# Patient Record
Sex: FEMALE | Race: BLACK OR AFRICAN AMERICAN | NOT HISPANIC OR LATINO | Employment: UNEMPLOYED | URBAN - METROPOLITAN AREA
[De-identification: names, ages, dates, MRNs, and addresses within clinical notes are randomized per-mention and may not be internally consistent; named-entity substitution may affect disease eponyms.]

---

## 2017-02-19 ENCOUNTER — HOSPITAL ENCOUNTER (EMERGENCY)
Facility: OTHER | Age: 32
Discharge: HOME | End: 2017-02-20
Attending: EMERGENCY MEDICINE
Payer: MEDICAID

## 2017-02-19 DIAGNOSIS — R19.7 DIARRHEA, UNSPECIFIED TYPE: ICD-10-CM

## 2017-02-19 DIAGNOSIS — R11.2 NON-INTRACTABLE VOMITING WITH NAUSEA, UNSPECIFIED VOMITING TYPE: Primary | ICD-10-CM

## 2017-02-19 LAB
B-HCG UR QL: NEGATIVE
CTP QC/QA: YES

## 2017-02-19 PROCEDURE — 25000003 PHARM REV CODE 250: Performed by: PHYSICIAN ASSISTANT

## 2017-02-19 PROCEDURE — 99283 EMERGENCY DEPT VISIT LOW MDM: CPT

## 2017-02-19 PROCEDURE — 81025 URINE PREGNANCY TEST: CPT | Performed by: EMERGENCY MEDICINE

## 2017-02-19 RX ORDER — ONDANSETRON 4 MG/1
4 TABLET, ORALLY DISINTEGRATING ORAL
Status: COMPLETED | OUTPATIENT
Start: 2017-02-19 | End: 2017-02-19

## 2017-02-19 RX ORDER — ONDANSETRON 4 MG/1
4 TABLET, ORALLY DISINTEGRATING ORAL EVERY 8 HOURS PRN
Qty: 12 TABLET | Refills: 0 | Status: SHIPPED | OUTPATIENT
Start: 2017-02-19 | End: 2022-07-20

## 2017-02-19 RX ORDER — DICYCLOMINE HYDROCHLORIDE 10 MG/1
10 CAPSULE ORAL
Qty: 20 CAPSULE | Refills: 0 | Status: SHIPPED | OUTPATIENT
Start: 2017-02-19 | End: 2017-03-21

## 2017-02-19 RX ORDER — DICYCLOMINE HYDROCHLORIDE 10 MG/1
10 CAPSULE ORAL
Status: COMPLETED | OUTPATIENT
Start: 2017-02-19 | End: 2017-02-19

## 2017-02-19 RX ADMIN — ONDANSETRON 4 MG: 4 TABLET, ORALLY DISINTEGRATING ORAL at 10:02

## 2017-02-19 RX ADMIN — DICYCLOMINE HYDROCHLORIDE 10 MG: 10 CAPSULE ORAL at 11:02

## 2017-02-19 NOTE — ED AVS SNAPSHOT
[facility]  [address]  [facility] [address]               Craig De Paz   2017 10:37 PM   ED    Description:  Female : 1985   Department:  [facility]           Your Care was Coordinated By:     Provider Role From To    Imer Tovar MD Attending Provider 17 --    Ana Maria Jeffers PA-C Physician Assistant 17 --      Reason for Visit     Nausea     Vomiting     Diarrhea           Diagnoses this Visit        Comments    Non-intractable vomiting with nausea, unspecified vomiting type    -  Primary     Diarrhea, unspecified type           ED Disposition     None           To Do List           Follow-up Information     Follow up with [facility] In 2 days.    Contact information:    [address]  [facility] [address] [phone]          Follow up with [facility].    Specialty:  Emergency Medicine    Why:  If symptoms worsen    Contact information:    [address]  [facility] [address] [phone]       These Medications        Disp Refills Start End    ondansetron (ZOFRAN-ODT) 4 MG TbDL 12 tablet 0 2017     Take 1 tablet (4 mg total) by mouth every 8 (eight) hours as needed. - Oral    Pharmacy: [facility] [address] [place] [address] [facility] Ph #: [phone]       dicyclomine (BENTYL) 10 MG capsule 20 capsule 0 2017 3/21/2017    Take 1 capsule (10 mg total) by mouth 4 (four) times daily before meals and nightly. - Oral    Pharmacy: [facility] [address] [place] [address] [facility] Ph #: [phone]         [facility]     [facility] Care [facility] -  Assistance  Registered nurses in the [facility] provide clinical advisement, health education, appointment booking, and other advisory services.  Call for this free service at  "[phone].             Medications           Message regarding Medications     Verify the changes and/or additions to your medication regime listed below are the same as discussed with your clinician today.  If any of these changes or additions are incorrect, please notify your healthcare provider.        START taking these NEW medications        Refills    ondansetron (ZOFRAN-ODT) 4 MG TbDL 0    Sig: Take 1 tablet (4 mg total) by mouth every 8 (eight) hours as needed.    Class: Print    Route: Oral    dicyclomine (BENTYL) 10 MG capsule 0    Sig: Take 1 capsule (10 mg total) by mouth 4 (four) times daily before meals and nightly.    Class: Print    Route: Oral      These medications were administered today        Dose Freq    ondansetron disintegrating tablet 4 mg 4 mg ED 1 Time    Sig: Take 1 tablet (4 mg total) by mouth ED 1 Time.    Class: Normal    Route: Oral    dicyclomine capsule 10 mg 10 mg ED 1 Time    Sig: Take 1 capsule (10 mg total) by mouth ED 1 Time.    Class: Normal    Route: Oral      STOP taking these medications     ibuprofen (ADVIL,MOTRIN) 800 MG tablet Take 1 tablet (800 mg total) by mouth 3 (three) times daily.           Verify that the below list of medications is an accurate representation of the medications you are currently taking.  If none reported, the list may be blank. If incorrect, please contact your healthcare provider. Carry this list with you in case of emergency.           Current Medications     dicyclomine (BENTYL) 10 MG capsule Take 1 capsule (10 mg total) by mouth 4 (four) times daily before meals and nightly.    ondansetron (ZOFRAN-ODT) 4 MG TbDL Take 1 tablet (4 mg total) by mouth every 8 (eight) hours as needed.           Clinical Reference Information           Your Vitals Were     BP Pulse Temp Resp Height Weight    126/75 (BP Location: Left arm) 96 98.9 °F (37.2 °C) (Oral) 16 5' 7" (1.702 m) 83 kg (183 lb)    SpO2 BMI             98% 28.66 kg/m2         Allergies as " of 2/19/2017     No Known Allergies      Immunizations Administered on Date of Encounter - 2/19/2017     None      ED Micro, Lab, POCT     Start Ordered       Status Ordering Provider    02/19/17 2123 02/19/17 2122  POCT urine pregnancy  Once      Final result       ED Imaging Orders     None      Discharge References/Attachments     VOMITING OR DIARRHEA (ADULT), DIET FOR (ENGLISH)    VOMITING AND DIARRHEA, SELF-CARE FOR (ENGLISH)    VOMITING AND DIARRHEA, NONSPECIFIC (ADULT) (ENGLISH)      [facility] Sign-Up     Activating your [facility] account is as easy as 1-2-3!     1) Visit [email or web address], select Sign Up Now, enter this activation code and your date of birth, then select Next.  [email or web address]  Expires: 4/5/2017 11:56 PM      2) Create a username and password to use when you visit [facility] in the future and select a security question in case you lose your password and select Next.    3) Enter your e-mail address and click Sign Up!    Additional Information  If you have questions, please e-mail [email or web address] or call [phone] to talk to our [facility] staff. Remember, [facility] is NOT to be used for urgent needs. For medical emergencies, dial 911.          [facility] complies with applicable Federal civil rights laws and does not discriminate on the basis of race, color, national origin, age, disability, or sex.        Language Assistance Services     ATTENTION: Language assistance services are available, free of charge. Please call [phone].      ATENCIÓN: Si habla español, tiene a [place] disposición servicios gratuitos de asistencia lingüística. Llame al [phone].     CHÚ Ý: N?u b?n nói Ti?ng Vi?t, có các d?ch v? h? tr? ngôn ng? mi?n phí dành cho b?n. G?i s? [phone].

## 2017-02-20 VITALS
OXYGEN SATURATION: 99 % | HEART RATE: 82 BPM | DIASTOLIC BLOOD PRESSURE: 76 MMHG | WEIGHT: 183 LBS | BODY MASS INDEX: 28.72 KG/M2 | RESPIRATION RATE: 16 BRPM | TEMPERATURE: 98 F | SYSTOLIC BLOOD PRESSURE: 122 MMHG | HEIGHT: 67 IN

## 2017-02-20 NOTE — ED PROVIDER NOTES
Encounter Date: 2/19/2017       History     Chief Complaint   Patient presents with    Nausea     Pt c/o N/V/D that started around 1pm today    Vomiting    Diarrhea     Review of patient's allergies indicates:  No Known Allergies  HPI Comments: Patient is a 31 y.o. female presenting to the emergency department with complaints of nausea, vomiting, and diarrhea.  The patient reports that she had lunch at approximately 1 PM.  She states that shortly thereafter, she started having nonbloody nausea, vomiting, diarrhea.  She states that she estimates about 7 episodes of diarrhea and 5 episodes of emesis.   She reports generalized abdominal pain that she rates at 9/10.  She states she's been unable to tolerate by mouth intake since then.  She denies fever or chills.    The history is provided by the patient.     Past Medical History   Diagnosis Date    Vaginal delivery following previous caesarean section      x1     Past Medical History Pertinent Negatives   Diagnosis Date Noted    Asthma 6/25/2014    Depression 6/25/2014    Diabetes mellitus 6/25/2014    GERD (gastroesophageal reflux disease) 6/25/2014    Hypertension 6/25/2014     Past Surgical History   Procedure Laterality Date    Hernia repair       Rt groin     Family History   Problem Relation Age of Onset    No Known Problems Mother     No Known Problems Father      Social History   Substance Use Topics    Smoking status: Never Smoker    Smokeless tobacco: Never Used    Alcohol use Yes      Comment: occassional     Review of Systems   Constitutional: Negative for activity change, appetite change, chills, fatigue and fever.   HENT: Negative for congestion, rhinorrhea, sinus pressure, sneezing, sore throat and trouble swallowing.    Eyes: Negative for photophobia and visual disturbance.   Respiratory: Negative for cough, chest tightness, shortness of breath and wheezing.    Cardiovascular: Negative for chest pain and palpitations.   Gastrointestinal:  Positive for diarrhea, nausea and vomiting. Negative for abdominal pain and constipation.   Genitourinary: Negative for dysuria, hematuria and urgency.   Musculoskeletal: Negative for back pain, myalgias, neck pain and neck stiffness.   Skin: Negative for color change and wound.   Neurological: Negative for dizziness, weakness, light-headedness, numbness and headaches.   Psychiatric/Behavioral: Negative for agitation and confusion.       Physical Exam   Initial Vitals   BP Pulse Resp Temp SpO2   02/19/17 2120 02/19/17 2120 02/19/17 2120 02/19/17 2120 02/19/17 2120   126/75 96 16 98.9 °F (37.2 °C) 98 %     Physical Exam    Nursing note and vitals reviewed.  Constitutional: Vital signs are normal. She appears well-developed and well-nourished. She is not diaphoretic. She is cooperative.  Non-toxic appearance. She does not have a sickly appearance. She does not appear ill. No distress.   -American female accompanied by another female me emergency department.  She is speaking in clear and full sentences, moving all extremities, ambulates without difficulty.  She is in no acute distress.   HENT:   Head: Normocephalic and atraumatic.   Right Ear: External ear normal.   Left Ear: External ear normal.   Nose: Nose normal.   Mouth/Throat: Oropharynx is clear and moist and mucous membranes are normal.   Eyes: Conjunctivae and EOM are normal.   Neck: Normal range of motion. Neck supple.   Cardiovascular: Normal rate, regular rhythm and normal heart sounds.   Pulmonary/Chest: Breath sounds normal. No respiratory distress. She has no wheezes. She has no rhonchi. She has no rales.   Abdominal: Soft. Bowel sounds are normal. She exhibits no distension. There is generalized tenderness. There is no rebound and no guarding.   Musculoskeletal: Normal range of motion.   Neurological: She is alert and oriented to person, place, and time. GCS eye subscore is 4. GCS verbal subscore is 5. GCS motor subscore is 6.   Skin: Skin is  warm.   Psychiatric: She has a normal mood and affect. Her behavior is normal. Judgment and thought content normal.         ED Course   Procedures  Labs Reviewed   POCT URINE PREGNANCY - Normal             Medical Decision Making:   Clinical Tests:   Lab Tests: Ordered and Reviewed  The following lab test(s) were unremarkable: UPT  Other:   I have discussed this case with another health care provider.       <> Summary of the Discussion: Dr. Tovar  This note was created using Dragon Medical Dictation. There may be typographical errors secondary to dictation.     Urgent evaluation of a 31 y.o. female presenting to the emergency department complaining of nausea, vomiting and diarrhea. Patient is afebrile, nontoxic appearing and hemodynamically stable. Physical exam reveals regular rate and rhythm, lungs are clear to auscultation bilaterally.  Generalized tenderness to palpation of the abdomen with no rebound, guarding, mass.  Will plan to obtain UPT, administer Zofran, try by mouth challenge.  On reassessment, the patient reported she was feeling much relief.  We'll administered Bentyl for abdominal cramping.  Given the patient's history and physical exam findings, she does not have evidence of an acute abdomen.  I do believe her signs and symptoms are likely secondary to a viral etiology.  I do not feel that further testing or imaging is warranted the emergency department.  The patient will be discharged home with a prescription for Zofran and Bentyl.  She was counseled extensively on symptomatic care and treatment including a bland diet. The patient was instructed to follow up with a primary care provider in 2 days or to return to the emergency department for worsening symptoms. The treatment plan was discussed with the patient who demonstrated understanding and comfort with plan. The patient's history, physical exam, and plan of care was discussed with and agreed upon with my supervising physician.     Past Medical  History   Diagnosis Date    Vaginal delivery following previous caesarean section      x1                     ED Course     Clinical Impression:     1. Non-intractable vomiting with nausea, unspecified vomiting type    2. Diarrhea, unspecified type       Disposition:   Disposition: Discharged  Condition: Stable       Ana Maria Jeffers PA-C  02/20/17 0056

## 2019-07-15 ENCOUNTER — HOSPITAL ENCOUNTER (EMERGENCY)
Facility: OTHER | Age: 34
Discharge: HOME | End: 2019-07-15
Attending: EMERGENCY MEDICINE
Payer: MEDICAID

## 2019-07-15 VITALS
SYSTOLIC BLOOD PRESSURE: 138 MMHG | OXYGEN SATURATION: 100 % | DIASTOLIC BLOOD PRESSURE: 80 MMHG | BODY MASS INDEX: 29.35 KG/M2 | TEMPERATURE: 99 F | RESPIRATION RATE: 16 BRPM | WEIGHT: 187 LBS | HEIGHT: 67 IN | HEART RATE: 82 BPM

## 2019-07-15 DIAGNOSIS — R06.02 SHORTNESS OF BREATH: ICD-10-CM

## 2019-07-15 DIAGNOSIS — R06.02 SOB (SHORTNESS OF BREATH): Primary | ICD-10-CM

## 2019-07-15 LAB
B-HCG UR QL: NEGATIVE
CTP QC/QA: YES

## 2019-07-15 PROCEDURE — 93005 ELECTROCARDIOGRAM TRACING: CPT

## 2019-07-15 PROCEDURE — 99284 EMERGENCY DEPT VISIT MOD MDM: CPT | Mod: 25

## 2019-07-15 PROCEDURE — 93010 EKG 12-LEAD: ICD-10-PCS | Mod: ,,, | Performed by: INTERNAL MEDICINE

## 2019-07-15 PROCEDURE — 93010 ELECTROCARDIOGRAM REPORT: CPT | Mod: ,,, | Performed by: INTERNAL MEDICINE

## 2019-07-15 PROCEDURE — 81025 URINE PREGNANCY TEST: CPT | Performed by: EMERGENCY MEDICINE

## 2019-07-16 NOTE — ED PROVIDER NOTES
"Encounter Date: 7/15/2019    SCRIBE #1 NOTE: I, Tres Edwards, am scribing for, and in the presence of, Dr. Desouza .       History     Chief Complaint   Patient presents with    Shortness of Breath     x months, w/ chest tightness x 3 days "since my client pointed out I was breathing hard, denies any cough, worse when sitting down, denies any respiratory or cardiac hx, speaking in full sentences     Time seen by provider: 10:00 PM    This is a 34 y.o. female who presents with complaint of shortness of breath that began approximately three months ago. The patient reports that she mainly started to notice that she has been feeling short of breath when laying down and in the middle of the night. She states that last night she wasn't able to sleep due to feeling short of breath. She states that she snores "a lot" at night and sometimes wakes up feeling like she cannot breath. She reports that she was told by her client recently that she has been breathing heavily which made her notice the shortness of breath. Her LNMP was on 6/1/19 and wasn't heavy. The patient denies taking any long trip or travel anywhere. She denies being on birth control. She denies fever, sore throat, chest pain, cough, nausea, and dysuria. She denies smoking, drinking, and illicit drug use.     The history is provided by the patient.     Review of patient's allergies indicates:  No Known Allergies  Past Medical History:   Diagnosis Date    Vaginal delivery following previous caesarean section     x1     Past Surgical History:   Procedure Laterality Date    BIOPSY, CERVIX N/A 5/7/2013    Performed by [facility] at [facility] OR    HERNIA REPAIR      Rt groin     Family History   Problem Relation Age of Onset    No Known Problems Mother     No Known Problems Father      Social History     Tobacco Use    Smoking status: Never Smoker    Smokeless tobacco: Never Used   Substance Use Topics    Alcohol use: Yes     Comment: occassional    " Drug use: No     Review of Systems   Constitutional: Negative for fever.   HENT: Negative for sore throat.    Respiratory: Positive for shortness of breath.    Cardiovascular: Negative for chest pain.   Gastrointestinal: Negative for nausea.   Genitourinary: Negative for dysuria.   Musculoskeletal: Negative for back pain.   Skin: Negative for rash.   Neurological: Negative for weakness.   Hematological: Does not bruise/bleed easily.       Physical Exam     Initial Vitals [07/15/19 2042]   BP Pulse Resp Temp SpO2   (Abnormal) 131/98 88 16 98.3 °F (36.8 °C) 100 %      MAP       --         Physical Exam    Nursing note and vitals reviewed.  Constitutional: She appears well-developed and well-nourished. She is not diaphoretic. No distress.   HENT:   Head: Normocephalic and atraumatic.   Mouth/Throat: Oropharynx is clear and moist.   Oropharynx is clear and intact. Moist mucous membranes.    Eyes: Conjunctivae and EOM are normal. Pupils are equal, round, and reactive to light.   Conjunctivae are clear, pink, and intact.    Neck: Normal range of motion.   Cardiovascular: Normal rate, regular rhythm and normal heart sounds. Exam reveals no gallop and no friction rub.    No murmur heard.  Normal S1, S2. No murmurs, no rubs, no gallops.    Pulmonary/Chest: Breath sounds normal. No respiratory distress. She has no wheezes. She has no rhonchi. She has no rales.   Lungs are clear to auscultation bilaterally.    Abdominal: Soft. There is no tenderness. There is no rebound and no guarding.   Musculoskeletal: Normal range of motion. She exhibits no edema or tenderness.   No lower extremity edema. No palpable chords.    Lymphadenopathy:     She has no cervical adenopathy.   Neurological: She is alert and oriented to person, place, and time.   Skin: Skin is warm and dry. No rash and no abscess noted. No erythema. No pallor.   Psychiatric: She has a normal mood and affect. Her behavior is normal. Judgment and thought content normal.          ED Course   Procedures  Labs Reviewed   POCT URINE PREGNANCY     EKG Readings: (Independently Interpreted)   Normal sinus rhythm at a rate of 79 bpm. No acute ST or T wave changes.        Imaging Results          X-Ray Chest PA And Lateral (Final result)  Result time 07/15/19 22:17:39    Final result by Desmond Lebron MD (07/15/19 22:17:39)             Impression:      No acute process.      Electronically signed by: Desmond Lebron MD  Date:    07/15/2019  Time:    22:17           Narrative:    EXAMINATION:  XR CHEST PA AND LATERAL    CLINICAL HISTORY:  Shortness of breath    TECHNIQUE:  PA and lateral views of the chest were performed.    COMPARISON:  None    FINDINGS:  The trachea is unremarkable.  The cardiomediastinal silhouette is within normal limits.  The hilar structures are unremarkable.  The hemidiaphragms are within normal limits.  There is no evidence of free air beneath the hemidiaphragms.  There are no pleural effusions.  There is no evidence of a pneumothorax.  There is no evidence of pneumomediastinum.  No airspace opacity is present.  The osseous structures are unremarkable.                            X-Rays:   Independently Interpreted Readings:   Chest X-Ray: Normal heart size.  No infiltrates.  No acute abnormalities.     Medical Decision Making:   History:   Old Medical Records: I decided to obtain old medical records.  Independently Interpreted Test(s):   I have ordered and independently interpreted X-rays - see prior notes.  I have ordered and independently interpreted EKG Reading(s) - see prior notes  Clinical Tests:   Lab Tests: Reviewed  Radiological Study: Reviewed  Medical Tests: Reviewed            Scribe Attestation:   Scribe #1: I performed the above scribed service and the documentation accurately describes the services I performed. I attest to the accuracy of the note.    Attending Attestation:           Physician Attestation for Scribe:  Physician Attestation Statement for  Scribe #1: I, Dr. Orourke, reviewed documentation, as scribed by Tres Edwards in my presence, and it is both accurate and complete.         Attending ED Notes:   Emergent evaluation a 34-year-old female with complaint of intermittent shortness of breath.  Patient is afebrile, nontoxic appearing with stable vital signs.  Lungs are clear to auscultation bilaterally. Patient in no acute respiratory distress.  Oxygen saturation is 100% on room air.  No acute findings on chest x-ray.  No risk factors for pulmonary embolus.  No acute findings on EKG.  The patient is extensively counseled on her diagnosis and treatment including all diagnostic and physical exam findings.  The patient discharged good condition and directed follow-up with her PCP in the next 24-48 hours.             Clinical Impression:     1. SOB (shortness of breath)    2. Shortness of breath                                   Charles Orourke MD  07/23/19 0899

## 2019-07-16 NOTE — ED TRIAGE NOTES
Pt arrives to Ed with c/o SOB. PT reports onset 3 months, reports problems breathing while sleeping at night, reports SOB when lying down. Pt sitting in chair, respirations even, unlabored, eyes open spontaneously, NAD noted, AAOx4, answering questions appropriately. Pt placed on BP cycling and pulse ox.

## 2019-09-15 ENCOUNTER — HOSPITAL ENCOUNTER (EMERGENCY)
Facility: OTHER | Age: 34
Discharge: HOME | End: 2019-09-15
Attending: EMERGENCY MEDICINE
Payer: MEDICAID

## 2019-09-15 VITALS
TEMPERATURE: 98 F | HEART RATE: 67 BPM | DIASTOLIC BLOOD PRESSURE: 83 MMHG | HEIGHT: 67 IN | OXYGEN SATURATION: 100 % | SYSTOLIC BLOOD PRESSURE: 142 MMHG | RESPIRATION RATE: 16 BRPM | WEIGHT: 187 LBS | BODY MASS INDEX: 29.35 KG/M2

## 2019-09-15 DIAGNOSIS — R14.0 ABDOMINAL BLOATING: ICD-10-CM

## 2019-09-15 DIAGNOSIS — O21.9 NAUSEA/VOMITING IN PREGNANCY: Primary | ICD-10-CM

## 2019-09-15 LAB
ALBUMIN SERPL BCP-MCNC: 3.4 G/DL (ref 3.5–5.2)
ALP SERPL-CCNC: 59 U/L (ref 55–135)
ALT SERPL W/O P-5'-P-CCNC: 27 U/L (ref 10–44)
ANION GAP SERPL CALC-SCNC: 8 MMOL/L (ref 8–16)
AST SERPL-CCNC: 20 U/L (ref 10–40)
B-HCG UR QL: POSITIVE
BASOPHILS # BLD AUTO: 0.04 K/UL (ref 0–0.2)
BASOPHILS NFR BLD: 0.6 % (ref 0–1.9)
BILIRUB SERPL-MCNC: 0.2 MG/DL (ref 0.1–1)
BILIRUB UR QL STRIP: NEGATIVE
BUN SERPL-MCNC: 8 MG/DL (ref 6–20)
CALCIUM SERPL-MCNC: 9.4 MG/DL (ref 8.7–10.5)
CHLORIDE SERPL-SCNC: 105 MMOL/L (ref 95–110)
CLARITY UR: CLEAR
CO2 SERPL-SCNC: 24 MMOL/L (ref 23–29)
COLOR UR: YELLOW
CREAT SERPL-MCNC: 0.7 MG/DL (ref 0.5–1.4)
CTP QC/QA: YES
DIFFERENTIAL METHOD: ABNORMAL
EOSINOPHIL # BLD AUTO: 0.1 K/UL (ref 0–0.5)
EOSINOPHIL NFR BLD: 0.9 % (ref 0–8)
ERYTHROCYTE [DISTWIDTH] IN BLOOD BY AUTOMATED COUNT: 14.6 % (ref 11.5–14.5)
EST. GFR  (AFRICAN AMERICAN): >60 ML/MIN/1.73 M^2
EST. GFR  (NON AFRICAN AMERICAN): >60 ML/MIN/1.73 M^2
GLUCOSE SERPL-MCNC: 94 MG/DL (ref 70–110)
GLUCOSE UR QL STRIP: NEGATIVE
HCG INTACT+B SERPL-ACNC: NORMAL MIU/ML
HCT VFR BLD AUTO: 38.1 % (ref 37–48.5)
HGB BLD-MCNC: 12.4 G/DL (ref 12–16)
HGB UR QL STRIP: NEGATIVE
IMM GRANULOCYTES # BLD AUTO: 0.02 K/UL (ref 0–0.04)
IMM GRANULOCYTES NFR BLD AUTO: 0.3 % (ref 0–0.5)
KETONES UR QL STRIP: NEGATIVE
LEUKOCYTE ESTERASE UR QL STRIP: NEGATIVE
LYMPHOCYTES # BLD AUTO: 2.6 K/UL (ref 1–4.8)
LYMPHOCYTES NFR BLD: 37.1 % (ref 18–48)
MCH RBC QN AUTO: 26.9 PG (ref 27–31)
MCHC RBC AUTO-ENTMCNC: 32.5 G/DL (ref 32–36)
MCV RBC AUTO: 83 FL (ref 82–98)
MONOCYTES # BLD AUTO: 0.8 K/UL (ref 0.3–1)
MONOCYTES NFR BLD: 11.3 % (ref 4–15)
NEUTROPHILS # BLD AUTO: 3.5 K/UL (ref 1.8–7.7)
NEUTROPHILS NFR BLD: 49.8 % (ref 38–73)
NITRITE UR QL STRIP: NEGATIVE
NRBC BLD-RTO: 0 /100 WBC
PH UR STRIP: 6 [PH] (ref 5–8)
PLATELET # BLD AUTO: 259 K/UL (ref 150–350)
PMV BLD AUTO: 10.6 FL (ref 9.2–12.9)
POCT GLUCOSE: 92 MG/DL (ref 70–110)
POTASSIUM SERPL-SCNC: 4.2 MMOL/L (ref 3.5–5.1)
PROT SERPL-MCNC: 7.3 G/DL (ref 6–8.4)
PROT UR QL STRIP: NEGATIVE
RBC # BLD AUTO: 4.61 M/UL (ref 4–5.4)
SODIUM SERPL-SCNC: 137 MMOL/L (ref 136–145)
SP GR UR STRIP: 1.01 (ref 1–1.03)
TSH SERPL DL<=0.005 MIU/L-ACNC: 2.11 UIU/ML (ref 0.4–4)
URN SPEC COLLECT METH UR: NORMAL
UROBILINOGEN UR STRIP-ACNC: NEGATIVE EU/DL
WBC # BLD AUTO: 7.01 K/UL (ref 3.9–12.7)

## 2019-09-15 PROCEDURE — 84443 ASSAY THYROID STIM HORMONE: CPT

## 2019-09-15 PROCEDURE — 96365 THER/PROPH/DIAG IV INF INIT: CPT

## 2019-09-15 PROCEDURE — 82962 GLUCOSE BLOOD TEST: CPT

## 2019-09-15 PROCEDURE — 85025 COMPLETE CBC W/AUTO DIFF WBC: CPT

## 2019-09-15 PROCEDURE — 87491 CHLMYD TRACH DNA AMP PROBE: CPT

## 2019-09-15 PROCEDURE — 81025 URINE PREGNANCY TEST: CPT | Performed by: EMERGENCY MEDICINE

## 2019-09-15 PROCEDURE — 99284 EMERGENCY DEPT VISIT MOD MDM: CPT | Mod: 25

## 2019-09-15 PROCEDURE — 80053 COMPREHEN METABOLIC PANEL: CPT

## 2019-09-15 PROCEDURE — 84702 CHORIONIC GONADOTROPIN TEST: CPT

## 2019-09-15 PROCEDURE — 36415 COLL VENOUS BLD VENIPUNCTURE: CPT

## 2019-09-15 PROCEDURE — 81003 URINALYSIS AUTO W/O SCOPE: CPT

## 2019-09-15 PROCEDURE — 63600175 PHARM REV CODE 636 W HCPCS: Performed by: EMERGENCY MEDICINE

## 2019-09-15 PROCEDURE — 96360 HYDRATION IV INFUSION INIT: CPT | Mod: 59

## 2019-09-15 RX ORDER — PROMETHAZINE HYDROCHLORIDE 25 MG/1
12.5 TABLET ORAL EVERY 6 HOURS PRN
Qty: 15 TABLET | Refills: 1 | Status: SHIPPED | OUTPATIENT
Start: 2019-09-15 | End: 2022-07-20

## 2019-09-15 RX ADMIN — SODIUM CHLORIDE 1000 ML: 0.9 INJECTION, SOLUTION INTRAVENOUS at 08:09

## 2019-09-15 RX ADMIN — PROMETHAZINE HYDROCHLORIDE 12.5 MG: 25 INJECTION INTRAMUSCULAR; INTRAVENOUS at 09:09

## 2019-09-16 LAB
C TRACH DNA SPEC QL NAA+PROBE: DETECTED
N GONORRHOEA DNA SPEC QL NAA+PROBE: NOT DETECTED

## 2019-09-16 NOTE — ED TRIAGE NOTES
"Patient presents to ER c/o fatigue and nauseous for 1 week. Pt states she is 8 weeks pregnant.  Pt reports being seen by her PCP on 9/11 and was prescribed Zofran for nausea but states "my medication was never called into the pharmacy."  Pt denies abdominal pain and vaginal bleeding. +n/-v/-diarrhea.  "

## 2019-09-16 NOTE — ED PROVIDER NOTES
"Encounter Date: 9/15/2019    SCRIBE #1 NOTE: I, Jazmin Rueda, am scribing for, and in the presence of, Dr. Bauer.       History     Chief Complaint   Patient presents with    Fatigue     and nausea x 1 week. LMP 7/19.      Time seen by provider: 8:23 PM    This is a 34 y.o. female who presents with complaint of generalized fatigue that began one week ago. Onset is accompanied by nausea. Patient was seen by her PCP for same symptoms 4 days ago and states he has not yet called in her Zofran prescription. She reports she took a home pregnancy test that was positive. Patient is G3.P1.A1. She had a early pregnancy miscarriage at 8 weeks gestation. Her last menstrual period was 7/19. Patient reports abdominal bloating, and states "it feels like my cycle is about to come." She denies fever, chills, vomiting, abdominal cramping, chest pain, SOB, leg swelling, leg pain, or weakness.    The history is provided by the patient.     Review of patient's allergies indicates:  No Known Allergies  Past Medical History:   Diagnosis Date    Vaginal delivery following previous caesarean section     x1     Past Surgical History:   Procedure Laterality Date    BIOPSY, CERVIX N/A 5/7/2013    Performed by [facility]IK[facility] at [facility] OR    HERNIA REPAIR      Rt groin     Family History   Problem Relation Age of Onset    No Known Problems Mother     No Known Problems Father      Social History     Tobacco Use    Smoking status: Never Smoker    Smokeless tobacco: Never Used   Substance Use Topics    Alcohol use: Yes     Comment: occassional    Drug use: No     Review of Systems   Constitutional: Positive for fatigue. Negative for chills and fever.   HENT: Negative for congestion, sore throat and trouble swallowing.    Eyes: Negative for photophobia and visual disturbance.   Respiratory: Negative for shortness of breath.    Cardiovascular: Negative for chest pain and leg swelling.   Gastrointestinal: Positive for nausea. Negative " for abdominal pain and vomiting.        Positive for abdominal bloating.   Genitourinary: Negative for dysuria and hematuria.   Musculoskeletal: Negative for back pain and neck pain.        Negative for leg pain.   Skin: Negative for rash and wound.   Neurological: Negative for weakness and headaches.   Psychiatric/Behavioral: Negative.        Physical Exam     Initial Vitals [09/15/19 1909]   BP Pulse Resp Temp SpO2   134/86 83 16 99.1 °F (37.3 °C) 100 %      MAP       --         Physical Exam    Nursing note and vitals reviewed.  Constitutional: She appears well-developed and well-nourished. No distress.   HENT:   Head: Normocephalic and atraumatic.   Eyes: Conjunctivae and EOM are normal. Pupils are equal, round, and reactive to light.   Neck: Normal range of motion. Neck supple.   Cardiovascular: Normal rate, regular rhythm and normal heart sounds. Exam reveals no gallop and no friction rub.    No murmur heard.  Pulmonary/Chest: Breath sounds normal. No respiratory distress. She has no wheezes. She has no rhonchi. She has no rales.   Abdominal: Soft. There is no tenderness. There is no rebound and no guarding.   Genitourinary: Uterus is enlarged. Cervix exhibits discharge. Right adnexum displays no mass and no tenderness. Left adnexum displays no mass and no tenderness.   Genitourinary Comments: No bleeding  Seven white vaginal discharge  Os closed  No cervical motion tenderness   Musculoskeletal: Normal range of motion. She exhibits no edema or tenderness.   Neurological: She is alert and oriented to person, place, and time. She has normal strength.   Skin: Skin is warm and dry. No rash noted.   Psychiatric: She has a normal mood and affect. Her behavior is normal. Judgment and thought content normal.         ED Course   Procedures  Labs Reviewed   POCT URINE PREGNANCY - Abnormal; Notable for the following components:       Result Value    POC Preg Test, Ur Positive (*)     All other components within normal  limits   URINALYSIS, REFLEX TO URINE CULTURE    Narrative:     Preferred Collection Type->Urine, Clean Catch   URINALYSIS, REFLEX TO URINE CULTURE   POCT GLUCOSE   POCT GLUCOSE MONITORING CONTINUOUS          Imaging Results    None          Medical Decision Making:   History:   Old Medical Records: I decided to obtain old medical records.  Clinical Tests:   Lab Tests: Ordered and Reviewed  Radiological Study: Ordered and Reviewed            Scribe Attestation:   Scribe #1: I performed the above scribed service and the documentation accurately describes the services I performed. I attest to the accuracy of the note.    Attending Attestation:           Physician Attestation for Scribe:  Physician Attestation Statement for Scribe #1: I, Dr. Bauer, reviewed documentation, as scribed by Jazmin Rueda in my presence, and it is both accurate and complete.                    Clinical Impression:     1. Nausea/vomiting in pregnancy    2. Abdominal bloating                                 Radha Bauer MD  09/22/19 5638

## 2020-01-14 ENCOUNTER — HOSPITAL ENCOUNTER (OUTPATIENT)
Facility: HOSPITAL | Age: 35
Discharge: HOME | End: 2020-01-14
Attending: OBSTETRICS & GYNECOLOGY | Admitting: OBSTETRICS & GYNECOLOGY
Payer: MEDICAID

## 2020-01-14 VITALS
SYSTOLIC BLOOD PRESSURE: 117 MMHG | OXYGEN SATURATION: 100 % | TEMPERATURE: 98 F | HEART RATE: 94 BPM | DIASTOLIC BLOOD PRESSURE: 73 MMHG

## 2020-01-14 DIAGNOSIS — O26.899 ABDOMINAL PAIN AFFECTING PREGNANCY: ICD-10-CM

## 2020-01-14 DIAGNOSIS — R10.9 ABDOMINAL PAIN AFFECTING PREGNANCY: ICD-10-CM

## 2020-01-14 LAB
BILIRUB UR QL STRIP: NEGATIVE
CLARITY UR: CLEAR
COLOR UR: NORMAL
GLUCOSE UR QL STRIP: NEGATIVE
HGB UR QL STRIP: NEGATIVE
KETONES UR QL STRIP: NEGATIVE
LEUKOCYTE ESTERASE UR QL STRIP: NEGATIVE
NITRITE UR QL STRIP: NEGATIVE
PH UR STRIP: 6 [PH] (ref 5–8)
PROT UR QL STRIP: NEGATIVE
SP GR UR STRIP: 1 (ref 1–1.03)
URN SPEC COLLECT METH UR: NORMAL
UROBILINOGEN UR STRIP-ACNC: NEGATIVE EU/DL

## 2020-01-14 PROCEDURE — 99211 OFF/OP EST MAY X REQ PHY/QHP: CPT | Mod: 25

## 2020-01-14 PROCEDURE — 59025 FETAL NON-STRESS TEST: CPT

## 2020-01-14 PROCEDURE — 81003 URINALYSIS AUTO W/O SCOPE: CPT

## 2020-01-15 NOTE — NURSING
Discharge instructions given to pt. Questions answered, verb understanding.     2055 Pt ambulated off unit with mother.

## 2020-01-15 NOTE — NURSING
"Pt presents to L&D c/o back pain from earlier today, no longer in pain. Pt was concerned about contractions as she felt a "tingling" in her lower abdomen. 0/10 pain. Pt reports +FM, denies LOF, VB. Urine obtained. Assessment complete. EFM applied x2. Abd soft & nontender to palp. History reviewed. Pt also reports a toothache which is being managed by her dentist. POC reviewed, pt verb understanding. Call light within reach.  "

## 2020-01-15 NOTE — DISCHARGE INSTRUCTIONS
Relieving Back Pain During Pregnancy: Wall Stretch, Body Bend  Before trying these exercises, talk to your healthcare provider to make sure they are safe for you. Ask your healthcare provider how many times to do each exercise.      Wall stretch Body bend   Wall stretch  This strengthens and loosens the muscles in your upper back:  1. Lean against a wall with a firm pillow or rolled towel under your shoulder blades. Your feet should be about 12 inches from the wall and shoulder-width apart. Point your chin down.  2. Breathe in. Push your shoulders, neck, and head against the wall. You will feel a stretch in your shoulders.  3. Hold for 5 counts. Then breathe out, and relax your shoulders and neck.  Body bends  This strengthens your back and buttocks muscles:  1. Stand with your legs shoulder-width apart. Put your hands on your upper thighs and bend your knees slightly.  2. Slowly bend forward at the hips. Push your hips back and keep your shoulders up. Make sure your back is straight. Youll feel a stretch in your upper thighs. Youll also feel your back muscles holding you in position.  3. Hold for 5 counts, then straighten.  Date Last Reviewed: 8/16/2015  © 9109-9432 [employer]. [address] [place] [address], [place] [address]. All rights reserved. This information is not intended as a substitute for professional medical care. Always follow your healthcare professional's instructions.

## 2020-03-04 ENCOUNTER — HOSPITAL ENCOUNTER (OUTPATIENT)
Facility: HOSPITAL | Age: 35
Discharge: HOME | End: 2020-03-04
Attending: OBSTETRICS & GYNECOLOGY | Admitting: OBSTETRICS & GYNECOLOGY
Payer: MEDICAID

## 2020-03-04 VITALS
RESPIRATION RATE: 16 BRPM | OXYGEN SATURATION: 98 % | WEIGHT: 196 LBS | DIASTOLIC BLOOD PRESSURE: 74 MMHG | TEMPERATURE: 98 F | HEIGHT: 67 IN | SYSTOLIC BLOOD PRESSURE: 118 MMHG | BODY MASS INDEX: 30.76 KG/M2 | HEART RATE: 101 BPM

## 2020-03-04 DIAGNOSIS — R10.9 ABDOMINAL PAIN DURING PREGNANCY: ICD-10-CM

## 2020-03-04 DIAGNOSIS — O26.899 ABDOMINAL PAIN DURING PREGNANCY: ICD-10-CM

## 2020-03-04 LAB
BACTERIA #/AREA URNS HPF: ABNORMAL /HPF
BILIRUB UR QL STRIP: NEGATIVE
CLARITY UR: ABNORMAL
COLOR UR: YELLOW
FIBRONECTIN FETAL SPEC QL: NEGATIVE
GLUCOSE UR QL STRIP: NEGATIVE
HGB UR QL STRIP: NEGATIVE
HYALINE CASTS #/AREA URNS LPF: 0 /LPF
KETONES UR QL STRIP: ABNORMAL
LEUKOCYTE ESTERASE UR QL STRIP: NEGATIVE
MICROSCOPIC COMMENT: ABNORMAL
NITRITE UR QL STRIP: NEGATIVE
PH UR STRIP: 5 [PH] (ref 5–8)
PROT UR QL STRIP: ABNORMAL
RBC #/AREA URNS HPF: 0 /HPF (ref 0–4)
SP GR UR STRIP: 1.01 (ref 1–1.03)
SQUAMOUS #/AREA URNS HPF: 15 /HPF
URN SPEC COLLECT METH UR: ABNORMAL
UROBILINOGEN UR STRIP-ACNC: NEGATIVE EU/DL
WBC #/AREA URNS HPF: 1 /HPF (ref 0–5)

## 2020-03-04 PROCEDURE — 82731 ASSAY OF FETAL FIBRONECTIN: CPT

## 2020-03-04 PROCEDURE — 99211 OFF/OP EST MAY X REQ PHY/QHP: CPT

## 2020-03-04 PROCEDURE — 81000 URINALYSIS NONAUTO W/SCOPE: CPT

## 2020-03-04 RX ORDER — ONDANSETRON 8 MG/1
8 TABLET, ORALLY DISINTEGRATING ORAL EVERY 8 HOURS PRN
Status: DISCONTINUED | OUTPATIENT
Start: 2020-03-04 | End: 2020-03-04 | Stop reason: HOSPADM

## 2020-03-04 RX ORDER — ACETAMINOPHEN 500 MG
500 TABLET ORAL EVERY 6 HOURS PRN
Status: DISCONTINUED | OUTPATIENT
Start: 2020-03-04 | End: 2020-03-04 | Stop reason: HOSPADM

## 2020-03-04 NOTE — DISCHARGE INSTRUCTIONS
Home Undelivered Discharge Instructions    After Discharge Orders:    See Dr Bautista next week for follow up.  Call physician's office for any questions or concerns    Current Discharge Medication List      CONTINUE these medications which have NOT CHANGED    Details   ondansetron (ZOFRAN-ODT) 4 MG TbDL Take 1 tablet (4 mg total) by mouth every 8 (eight) hours as needed.  Qty: 12 tablet, Refills: 0      promethazine (PHENERGAN) 25 MG tablet Take 0.5 tablets (12.5 mg total) by mouth every 6 (six) hours as needed for Nausea.  Qty: 15 tablet, Refills: 1                     · Diet:  normal diet as tolerated    · Rest: normal activity as tolerated    Other instructions: Do kick counts once a day on your baby. Choose the time of day your baby is most active. Get in a comfortable lying or sitting position and time how long it takes to feel 10 kicks, twists, turns, swishes, or rolls. Call your physician or midwife if there have not been 10 kicks in 1.5 hours    Call physician or midwife, return to Labor and Delivery, call 911, or go to the nearest Emergency Room if: increased leakage or fluid, contractions more than  10 per  1 hour, decreased fetal movement, persistent low back pain or cramping, bleeding from vaginal area, difficulty urinating, pain with urination, difficulty breathing, new calf pain, persistent headache or vision change

## 2020-03-04 NOTE — TREATMENT PLAN
1415 Pt is  at 32 weeks 5 days arrived to L&D unit via wheelchair with complaints of urinary frequency and some cramping. Rates pain 0/10 at present. Pt denies any vaginal bleeding or leaking of fluid. +FM. Care assumed. Full assessment done, history and medications reviewed with pt. Pt updated on plan of care with questions answered. Bed in low locked position, call bell in reach. Pt placed on EFM and TOCO, automatic BP and pulse ox.     1430 Dr Hemphill on unit. Made aware of pt's arrival and complaints. Strip reviewed. New orders given for FFN, SVE, and send urine for UA. Verbal recall.     1725 Dr Hemphill made aware of pt's ctx spacing now, after PO hydration and emptying bladder. FFN negative, UA results given. Orders given to recheck cervix and if no change, may dc home. Verbal recall.     1800 SVE done with no change from previous exam. Discharge instructions given and explained. Pt instructed on importance of staying hydrated and emptying her bladder regularly. Verbal recall. States she has an appt with Dr Bautista on Wed. Instructed to keep that appt. Education given on  labor precautions and kick counts. Pt ambulated off unit with her mother with  nad noted.

## 2020-05-07 ENCOUNTER — HOSPITAL ENCOUNTER (EMERGENCY)
Facility: HOSPITAL | Age: 35
Discharge: HOME | End: 2020-05-07
Attending: EMERGENCY MEDICINE
Payer: MEDICAID

## 2020-05-07 VITALS
HEIGHT: 67 IN | TEMPERATURE: 99 F | SYSTOLIC BLOOD PRESSURE: 122 MMHG | OXYGEN SATURATION: 100 % | BODY MASS INDEX: 30.76 KG/M2 | DIASTOLIC BLOOD PRESSURE: 80 MMHG | HEART RATE: 67 BPM | RESPIRATION RATE: 18 BRPM | WEIGHT: 196 LBS

## 2020-05-07 DIAGNOSIS — R11.10 VOMITING AND DIARRHEA: Primary | ICD-10-CM

## 2020-05-07 DIAGNOSIS — R19.7 VOMITING AND DIARRHEA: Primary | ICD-10-CM

## 2020-05-07 DIAGNOSIS — R10.11 ABDOMINAL PAIN, BILATERAL UPPER QUADRANT: ICD-10-CM

## 2020-05-07 DIAGNOSIS — R10.12 ABDOMINAL PAIN, BILATERAL UPPER QUADRANT: ICD-10-CM

## 2020-05-07 LAB
ALBUMIN SERPL BCP-MCNC: 3.4 G/DL (ref 3.5–5.2)
ALP SERPL-CCNC: 91 U/L (ref 55–135)
ALT SERPL W/O P-5'-P-CCNC: 13 U/L (ref 10–44)
ANION GAP SERPL CALC-SCNC: 9 MMOL/L (ref 8–16)
AST SERPL-CCNC: 21 U/L (ref 10–40)
BACTERIA #/AREA URNS HPF: NORMAL /HPF
BASOPHILS # BLD AUTO: 0.03 K/UL (ref 0–0.2)
BASOPHILS NFR BLD: 0.4 % (ref 0–1.9)
BILIRUB SERPL-MCNC: 0.3 MG/DL (ref 0.1–1)
BILIRUB UR QL STRIP: NEGATIVE
BUN SERPL-MCNC: 14 MG/DL (ref 6–20)
CALCIUM SERPL-MCNC: 8.6 MG/DL (ref 8.7–10.5)
CHLORIDE SERPL-SCNC: 110 MMOL/L (ref 95–110)
CLARITY UR: CLEAR
CO2 SERPL-SCNC: 21 MMOL/L (ref 23–29)
COLOR UR: ABNORMAL
CREAT SERPL-MCNC: 0.8 MG/DL (ref 0.5–1.4)
DIFFERENTIAL METHOD: ABNORMAL
EOSINOPHIL # BLD AUTO: 0 K/UL (ref 0–0.5)
EOSINOPHIL NFR BLD: 0.1 % (ref 0–8)
ERYTHROCYTE [DISTWIDTH] IN BLOOD BY AUTOMATED COUNT: 15.2 % (ref 11.5–14.5)
EST. GFR  (AFRICAN AMERICAN): >60 ML/MIN/1.73 M^2
EST. GFR  (NON AFRICAN AMERICAN): >60 ML/MIN/1.73 M^2
GLUCOSE SERPL-MCNC: 93 MG/DL (ref 70–110)
GLUCOSE UR QL STRIP: NEGATIVE
HCT VFR BLD AUTO: 35 % (ref 37–48.5)
HGB BLD-MCNC: 11.1 G/DL (ref 12–16)
HGB UR QL STRIP: NEGATIVE
HYALINE CASTS #/AREA URNS LPF: 0 /LPF
IMM GRANULOCYTES # BLD AUTO: 0.02 K/UL (ref 0–0.04)
IMM GRANULOCYTES NFR BLD AUTO: 0.3 % (ref 0–0.5)
KETONES UR QL STRIP: NEGATIVE
LEUKOCYTE ESTERASE UR QL STRIP: NEGATIVE
LIPASE SERPL-CCNC: 21 U/L (ref 4–60)
LYMPHOCYTES # BLD AUTO: 1.2 K/UL (ref 1–4.8)
LYMPHOCYTES NFR BLD: 16.6 % (ref 18–48)
MCH RBC QN AUTO: 26.1 PG (ref 27–31)
MCHC RBC AUTO-ENTMCNC: 31.7 G/DL (ref 32–36)
MCV RBC AUTO: 82 FL (ref 82–98)
MICROSCOPIC COMMENT: NORMAL
MONOCYTES # BLD AUTO: 0.3 K/UL (ref 0.3–1)
MONOCYTES NFR BLD: 4.4 % (ref 4–15)
NEUTROPHILS # BLD AUTO: 5.6 K/UL (ref 1.8–7.7)
NEUTROPHILS NFR BLD: 78.2 % (ref 38–73)
NITRITE UR QL STRIP: NEGATIVE
NRBC BLD-RTO: 0 /100 WBC
PH UR STRIP: 6 [PH] (ref 5–8)
PLATELET # BLD AUTO: 283 K/UL (ref 150–350)
PMV BLD AUTO: 10.3 FL (ref 9.2–12.9)
POTASSIUM SERPL-SCNC: 4 MMOL/L (ref 3.5–5.1)
PROT SERPL-MCNC: 7 G/DL (ref 6–8.4)
PROT UR QL STRIP: ABNORMAL
RBC # BLD AUTO: 4.25 M/UL (ref 4–5.4)
RBC #/AREA URNS HPF: 2 /HPF (ref 0–4)
SODIUM SERPL-SCNC: 140 MMOL/L (ref 136–145)
SP GR UR STRIP: 1.01 (ref 1–1.03)
SQUAMOUS #/AREA URNS HPF: 2 /HPF
URN SPEC COLLECT METH UR: ABNORMAL
UROBILINOGEN UR STRIP-ACNC: NEGATIVE EU/DL
WBC # BLD AUTO: 7.22 K/UL (ref 3.9–12.7)
WBC #/AREA URNS HPF: 2 /HPF (ref 0–5)

## 2020-05-07 PROCEDURE — 99284 EMERGENCY DEPT VISIT MOD MDM: CPT | Mod: 25

## 2020-05-07 PROCEDURE — 96361 HYDRATE IV INFUSION ADD-ON: CPT

## 2020-05-07 PROCEDURE — 83690 ASSAY OF LIPASE: CPT

## 2020-05-07 PROCEDURE — 85025 COMPLETE CBC W/AUTO DIFF WBC: CPT

## 2020-05-07 PROCEDURE — 25000003 PHARM REV CODE 250: Performed by: EMERGENCY MEDICINE

## 2020-05-07 PROCEDURE — 63600175 PHARM REV CODE 636 W HCPCS: Performed by: EMERGENCY MEDICINE

## 2020-05-07 PROCEDURE — 81000 URINALYSIS NONAUTO W/SCOPE: CPT

## 2020-05-07 PROCEDURE — 96374 THER/PROPH/DIAG INJ IV PUSH: CPT

## 2020-05-07 PROCEDURE — 80053 COMPREHEN METABOLIC PANEL: CPT

## 2020-05-07 RX ORDER — DICYCLOMINE HYDROCHLORIDE 20 MG/1
20 TABLET ORAL EVERY 6 HOURS PRN
Qty: 20 TABLET | Refills: 0 | OUTPATIENT
Start: 2020-05-07 | End: 2022-07-20

## 2020-05-07 RX ORDER — ONDANSETRON 2 MG/ML
4 INJECTION INTRAMUSCULAR; INTRAVENOUS
Status: COMPLETED | OUTPATIENT
Start: 2020-05-07 | End: 2020-05-07

## 2020-05-07 RX ORDER — DICYCLOMINE HYDROCHLORIDE 10 MG/ML
20 INJECTION INTRAMUSCULAR
Status: DISCONTINUED | OUTPATIENT
Start: 2020-05-07 | End: 2020-05-07 | Stop reason: HOSPADM

## 2020-05-07 RX ORDER — PANTOPRAZOLE SODIUM 40 MG/1
80 TABLET, DELAYED RELEASE ORAL
Status: DISCONTINUED | OUTPATIENT
Start: 2020-05-07 | End: 2020-05-07 | Stop reason: HOSPADM

## 2020-05-07 RX ORDER — ONDANSETRON 4 MG/1
4 TABLET, FILM COATED ORAL EVERY 8 HOURS PRN
Qty: 12 TABLET | Refills: 0 | Status: SHIPPED | OUTPATIENT
Start: 2020-05-07 | End: 2020-05-14

## 2020-05-07 RX ADMIN — SODIUM CHLORIDE 1000 ML: 0.9 INJECTION, SOLUTION INTRAVENOUS at 08:05

## 2020-05-07 RX ADMIN — ONDANSETRON HYDROCHLORIDE 4 MG: 2 SOLUTION INTRAMUSCULAR; INTRAVENOUS at 07:05

## 2020-05-07 NOTE — ED TRIAGE NOTES
Pt arrived to the ED due to generalized abdominal cramping that started around 3:30 am this morning. Pt also reports n/v/d

## 2020-05-07 NOTE — DISCHARGE INSTRUCTIONS
Please return immediately if you get worse or if new problems develop.  Please follow-up with your primary care doctor this week.  Rest.  You may also follow up with the doctor above.  Lots of clear liquids only while you have nausea vomiting diarrhea and abdominal pain.

## 2020-05-14 ENCOUNTER — HOSPITAL ENCOUNTER (EMERGENCY)
Facility: HOSPITAL | Age: 35
Discharge: HOME | End: 2020-05-14
Attending: EMERGENCY MEDICINE
Payer: MEDICAID

## 2020-05-14 VITALS
HEART RATE: 68 BPM | WEIGHT: 169 LBS | RESPIRATION RATE: 18 BRPM | BODY MASS INDEX: 26.53 KG/M2 | TEMPERATURE: 98 F | HEIGHT: 67 IN | OXYGEN SATURATION: 100 % | DIASTOLIC BLOOD PRESSURE: 69 MMHG | SYSTOLIC BLOOD PRESSURE: 137 MMHG

## 2020-05-14 DIAGNOSIS — R74.8 ELEVATED LIVER ENZYMES: ICD-10-CM

## 2020-05-14 DIAGNOSIS — R10.13 EPIGASTRIC ABDOMINAL PAIN: Primary | ICD-10-CM

## 2020-05-14 LAB
ALBUMIN SERPL-MCNC: 3.6 G/DL (ref 3.3–5.5)
ALP SERPL-CCNC: 110 U/L (ref 42–141)
B-HCG UR QL: NEGATIVE
BILIRUB SERPL-MCNC: 1.5 MG/DL (ref 0.2–1.6)
BILIRUBIN, POC UA: ABNORMAL
BLOOD, POC UA: ABNORMAL
BUN SERPL-MCNC: 9 MG/DL (ref 7–22)
CALCIUM SERPL-MCNC: 9.3 MG/DL (ref 8–10.3)
CHLORIDE SERPL-SCNC: 107 MMOL/L (ref 98–108)
CLARITY, POC UA: CLEAR
COLOR, POC UA: YELLOW
CREAT SERPL-MCNC: 0.9 MG/DL (ref 0.6–1.2)
CTP QC/QA: YES
GLUCOSE SERPL-MCNC: 97 MG/DL (ref 73–118)
GLUCOSE, POC UA: NEGATIVE
KETONES, POC UA: NEGATIVE
LEUKOCYTE EST, POC UA: NEGATIVE
NITRITE, POC UA: NEGATIVE
PH UR STRIP: 5.5 [PH]
POC ALT (SGPT): 106 U/L (ref 10–47)
POC AST (SGOT): 213 U/L (ref 11–38)
POC TCO2: 25 MMOL/L (ref 18–33)
POTASSIUM BLD-SCNC: 4.5 MMOL/L (ref 3.6–5.1)
PROTEIN, POC UA: ABNORMAL
PROTEIN, POC: 7.4 G/DL (ref 6.4–8.1)
SODIUM BLD-SCNC: 141 MMOL/L (ref 128–145)
SPECIFIC GRAVITY, POC UA: >=1.03
UROBILINOGEN, POC UA: 0.2 E.U./DL

## 2020-05-14 PROCEDURE — 81025 URINE PREGNANCY TEST: CPT | Mod: ER | Performed by: EMERGENCY MEDICINE

## 2020-05-14 PROCEDURE — 80053 COMPREHEN METABOLIC PANEL: CPT | Mod: ER

## 2020-05-14 PROCEDURE — 85025 COMPLETE CBC W/AUTO DIFF WBC: CPT | Mod: ER

## 2020-05-14 PROCEDURE — C9113 INJ PANTOPRAZOLE SODIUM, VIA: HCPCS | Mod: ER | Performed by: PHYSICIAN ASSISTANT

## 2020-05-14 PROCEDURE — 99284 EMERGENCY DEPT VISIT MOD MDM: CPT | Mod: 25,ER

## 2020-05-14 PROCEDURE — 81003 URINALYSIS AUTO W/O SCOPE: CPT | Mod: ER

## 2020-05-14 PROCEDURE — 25000003 PHARM REV CODE 250: Mod: ER | Performed by: PHYSICIAN ASSISTANT

## 2020-05-14 PROCEDURE — 63600175 PHARM REV CODE 636 W HCPCS: Mod: ER | Performed by: PHYSICIAN ASSISTANT

## 2020-05-14 PROCEDURE — 96361 HYDRATE IV INFUSION ADD-ON: CPT | Mod: ER

## 2020-05-14 PROCEDURE — 96374 THER/PROPH/DIAG INJ IV PUSH: CPT | Mod: ER

## 2020-05-14 RX ORDER — MAG HYDROX/ALUMINUM HYD/SIMETH 200-200-20
15 SUSPENSION, ORAL (FINAL DOSE FORM) ORAL
Status: COMPLETED | OUTPATIENT
Start: 2020-05-14 | End: 2020-05-14

## 2020-05-14 RX ORDER — LIDOCAINE HYDROCHLORIDE 20 MG/ML
5 SOLUTION OROPHARYNGEAL
Status: COMPLETED | OUTPATIENT
Start: 2020-05-14 | End: 2020-05-14

## 2020-05-14 RX ORDER — ACETAMINOPHEN 500 MG
1000 TABLET ORAL
Status: COMPLETED | OUTPATIENT
Start: 2020-05-14 | End: 2020-05-14

## 2020-05-14 RX ORDER — PANTOPRAZOLE SODIUM 40 MG/10ML
40 INJECTION, POWDER, LYOPHILIZED, FOR SOLUTION INTRAVENOUS
Status: COMPLETED | OUTPATIENT
Start: 2020-05-14 | End: 2020-05-14

## 2020-05-14 RX ADMIN — PANTOPRAZOLE SODIUM 40 MG: 40 INJECTION, POWDER, LYOPHILIZED, FOR SOLUTION INTRAVENOUS at 04:05

## 2020-05-14 RX ADMIN — ACETAMINOPHEN 1000 MG: 500 TABLET ORAL at 05:05

## 2020-05-14 RX ADMIN — SODIUM CHLORIDE 1000 ML: 0.9 INJECTION, SOLUTION INTRAVENOUS at 04:05

## 2020-05-14 RX ADMIN — LIDOCAINE HYDROCHLORIDE 5 ML: 20 SOLUTION ORAL; TOPICAL at 04:05

## 2020-05-14 RX ADMIN — ALUMINUM HYDROXIDE, MAGNESIUM HYDROXIDE, AND SIMETHICONE 15 ML: 200; 200; 20 SUSPENSION ORAL at 04:05

## 2020-05-14 NOTE — ED PROVIDER NOTES
Encounter Date: 5/14/2020    SCRIBE #1 NOTE: I, Mandie Dunn am scribing for, and in the presence of,  LUIS ANGEL Calle. I have scribed the following portions of the note - Other sections scribed: HPI, ROS, PE.       History     Chief Complaint   Patient presents with    Abdominal Pain     upper abdominal cramping, onset early this am, vomiting x 3, diarrhea x 2, denies fever     Craig De Paz is a 34 y.o. female who presents to the ED complaining of epigastric pain with vomiting and diarrhea that began at 01:00 this morning. She has had 2 diarrheal episodes and 3 episodes of vomiting today, but denies significant nausea in between vomiting episodes. Diarrhea and vomit both non-bloody. She had similar symptoms a week ago: prior to each episode last week, she drank milk. Has never had lactose intolerance before. Patient was seen in ER for abd pain, vomiting, and diarrhea last week and discharged home.    The history is provided by the patient. No  was used.     Review of patient's allergies indicates:  No Known Allergies  Past Medical History:   Diagnosis Date    Vaginal delivery following previous caesarean section     x1     Past Surgical History:   Procedure Laterality Date    HERNIA REPAIR      Rt groin     Family History   Problem Relation Age of Onset    No Known Problems Mother     No Known Problems Father      Social History     Tobacco Use    Smoking status: Never Smoker    Smokeless tobacco: Never Used   Substance Use Topics    Alcohol use: Yes     Comment: occassional    Drug use: No     Review of Systems   Gastrointestinal: Positive for abdominal pain, diarrhea, nausea and vomiting.   All other systems reviewed and are negative.      Physical Exam     Initial Vitals [05/14/20 1520]   BP Pulse Resp Temp SpO2   125/84 109 20 99.4 °F (37.4 °C) 100 %      MAP       --         Physical Exam    Nursing note and vitals reviewed.  Constitutional: She appears well-developed and  well-nourished.   HENT:   Head: Normocephalic and atraumatic.   Eyes: Conjunctivae are normal.   Neck: Normal range of motion. Neck supple.   Cardiovascular: Regular rhythm and normal heart sounds. Tachycardia present.  Exam reveals no gallop and no friction rub.    No murmur heard.  Pulmonary/Chest: Breath sounds normal. No respiratory distress. She has no wheezes. She has no rhonchi. She has no rales.   Abdominal: Soft. There is tenderness (minimal) in the epigastric area. There is negative Oconnor's sign.   Musculoskeletal: Normal range of motion. She exhibits no edema.   Neurological: She is alert and oriented to person, place, and time. GCS score is 15. GCS eye subscore is 4. GCS verbal subscore is 5. GCS motor subscore is 6.   Skin: Skin is warm and dry.   Psychiatric: She has a normal mood and affect.         ED Course   Procedures  Labs Reviewed   POCT URINALYSIS W/O SCOPE - Abnormal; Notable for the following components:       Result Value    Bilirubin, UA 1+ (*)     Spec Grav UA >=1.030 (*)     Blood, UA 3+ (*)     Protein, UA 2+ (*)     All other components within normal limits   POCT CMP - Abnormal; Notable for the following components:    ALT (SGPT),  (*)     AST (SGOT),  (*)     All other components within normal limits   POCT URINE PREGNANCY   POCT URINALYSIS W/O SCOPE   POCT CBC   POCT CMP              Imaging Results          US Abdomen Limited (Final result)  Result time 05/14/20 17:34:36    Final result by Xochitl Ballesteros MD (05/14/20 17:34:36)                 Impression:      No evidence of cholelithiasis or acute cholecystitis.      Electronically signed by: Xochitl Ballesteros  Date:    05/14/2020  Time:    17:34             Narrative:    EXAMINATION:  ULTRASOUND ABDOMEN LIMITED (GALLBLADDER)    CLINICAL HISTORY:  Upper abdominal pain with elevated liver enzymes.  Evaluate for cholecystitis, cholelithiasis, choledocholithiasis;    TECHNIQUE:  Limited ultrasound of the right upper  quadrant of the abdomen with attention to the gallbladder was performed.    COMPARISON:  None.    FINDINGS:  Liver: No intrahepatic biliary ductal dilatation.    Gallbladder: No calculi.  No wall thickening, or pericholecystic fluid.  No sonographic Oconnor's sign.    Biliary system: The common duct is not dilated, measuring 3.1 mm.  No intrahepatic ductal dilatation.    Right kidney: 10.8 cm in bipolar length.    Miscellaneous: No upper abdominal ascites.                              X-Rays:   Independently Interpreted Readings:   Other Readings:  Right upper quadrant ultrasound without evidence of cholecystitis or cholelithiasis    Medical Decision Making:   History:   Old Medical Records: I decided to obtain old medical records.  Clinical Tests:   Lab Tests: Ordered and Reviewed  ED Management:  34-year-old female with epigastric abdominal pain similar to episode 1 week ago.  She has mild tenderness with no right upper quadrant tenderness or Oconnor sign.  She is afebrile with reassuring vital signs.  Labs with slightly elevated LFTs, increased from 1 week ago.  Ultrasound without evidence of cholecystitis or cholelithiasis.  Pain improved with treatment.  Low suspicion for GI bleed.  Will treat with H2 blocker and discharged home to follow up with PCP and GI.  Patient tolerating p.o. in safe for discharge.            Scribe Attestation:   Scribe #1: I performed the above scribed service and the documentation accurately describes the services I performed. I attest to the accuracy of the note.     Mickey Stafford                      Clinical Impression:     1. Epigastric abdominal pain    2. Elevated liver enzymes                ED Disposition Condition    Discharge Stable        ED Prescriptions     Medication Sig Dispense Start Date End Date Auth. Provider    ranitidine (ZANTAC) 150 MG capsule Take 1 capsule (150 mg total) by mouth nightly. 15 capsule 5/14/2020 5/14/2021 Mickey Stafford PA-C        Follow-up  Information     Follow up With Specialties Details Why Contact Info    Primary care provider  Schedule an appointment as soon as possible for a visit       [facility] Emergency Medicine Go to  If symptoms worsen [id number] [facility] [id number] [phone]                                     Mickey Stafford PABonnyC  05/14/20 4396

## 2020-05-14 NOTE — ED TRIAGE NOTES
Pt reports she started having epigastric pain around 1 am, reports pain is intermittent.  Reports she vomited x 3.  Reports 2 episodes of diarrhea.  Reports she had this happen to her about 1 week ago and came to ED, reports her blood work was normal, but reports no imaging was done.  Denies blood in stool or emesis.

## 2020-05-22 ENCOUNTER — HOSPITAL ENCOUNTER (EMERGENCY)
Facility: HOSPITAL | Age: 35
Discharge: HOME | End: 2020-05-22
Attending: EMERGENCY MEDICINE
Payer: MEDICAID

## 2020-05-22 VITALS
DIASTOLIC BLOOD PRESSURE: 83 MMHG | HEART RATE: 78 BPM | RESPIRATION RATE: 18 BRPM | BODY MASS INDEX: 24.96 KG/M2 | HEIGHT: 67 IN | WEIGHT: 159 LBS | SYSTOLIC BLOOD PRESSURE: 136 MMHG | OXYGEN SATURATION: 100 % | TEMPERATURE: 99 F

## 2020-05-22 DIAGNOSIS — R10.10 PAIN OF UPPER ABDOMEN: Primary | ICD-10-CM

## 2020-05-22 DIAGNOSIS — R10.9 RIGHT FLANK PAIN: ICD-10-CM

## 2020-05-22 DIAGNOSIS — R11.2 NAUSEA AND VOMITING, INTRACTABILITY OF VOMITING NOT SPECIFIED, UNSPECIFIED VOMITING TYPE: ICD-10-CM

## 2020-05-22 LAB
ALBUMIN SERPL-MCNC: 3.4 G/DL (ref 3.3–5.5)
ALP SERPL-CCNC: 118 U/L (ref 42–141)
B-HCG UR QL: NEGATIVE
BILIRUB SERPL-MCNC: 0.7 MG/DL (ref 0.2–1.6)
BILIRUBIN, POC UA: NEGATIVE
BLOOD, POC UA: NEGATIVE
BUN SERPL-MCNC: 10 MG/DL (ref 7–22)
CALCIUM SERPL-MCNC: 9.6 MG/DL (ref 8–10.3)
CHLORIDE SERPL-SCNC: 105 MMOL/L (ref 98–108)
CLARITY, POC UA: ABNORMAL
COLOR, POC UA: ABNORMAL
CREAT SERPL-MCNC: 1 MG/DL (ref 0.6–1.2)
CTP QC/QA: YES
GLUCOSE SERPL-MCNC: 107 MG/DL (ref 73–118)
GLUCOSE, POC UA: NEGATIVE
KETONES, POC UA: ABNORMAL
LEUKOCYTE EST, POC UA: ABNORMAL
NITRITE, POC UA: NEGATIVE
PH UR STRIP: 5.5 [PH]
POC ALT (SGPT): 64 U/L (ref 10–47)
POC AST (SGOT): 119 U/L (ref 11–38)
POC TCO2: 26 MMOL/L (ref 18–33)
POTASSIUM BLD-SCNC: 3.7 MMOL/L (ref 3.6–5.1)
PROTEIN, POC UA: ABNORMAL
PROTEIN, POC: 7.2 G/DL (ref 6.4–8.1)
SODIUM BLD-SCNC: 145 MMOL/L (ref 128–145)
SPECIFIC GRAVITY, POC UA: 1.02
UROBILINOGEN, POC UA: 0.2 E.U./DL

## 2020-05-22 PROCEDURE — 96365 THER/PROPH/DIAG IV INF INIT: CPT | Mod: ER

## 2020-05-22 PROCEDURE — 96375 TX/PRO/DX INJ NEW DRUG ADDON: CPT | Mod: ER

## 2020-05-22 PROCEDURE — 63600175 PHARM REV CODE 636 W HCPCS: Mod: ER | Performed by: EMERGENCY MEDICINE

## 2020-05-22 PROCEDURE — 85025 COMPLETE CBC W/AUTO DIFF WBC: CPT | Mod: ER

## 2020-05-22 PROCEDURE — 99285 EMERGENCY DEPT VISIT HI MDM: CPT | Mod: 25,ER

## 2020-05-22 PROCEDURE — 25000003 PHARM REV CODE 250: Mod: ER | Performed by: EMERGENCY MEDICINE

## 2020-05-22 PROCEDURE — 80053 COMPREHEN METABOLIC PANEL: CPT | Mod: ER

## 2020-05-22 PROCEDURE — 81025 URINE PREGNANCY TEST: CPT | Mod: ER | Performed by: EMERGENCY MEDICINE

## 2020-05-22 PROCEDURE — 81003 URINALYSIS AUTO W/O SCOPE: CPT | Mod: ER

## 2020-05-22 PROCEDURE — S0028 INJECTION, FAMOTIDINE, 20 MG: HCPCS | Mod: ER | Performed by: EMERGENCY MEDICINE

## 2020-05-22 RX ORDER — PANTOPRAZOLE SODIUM 20 MG/1
20 TABLET, DELAYED RELEASE ORAL DAILY
Qty: 20 TABLET | Refills: 0 | Status: SHIPPED | OUTPATIENT
Start: 2020-05-22 | End: 2020-06-11

## 2020-05-22 RX ORDER — MORPHINE SULFATE 8 MG/ML
2 INJECTION INTRAMUSCULAR; INTRAVENOUS; SUBCUTANEOUS
Status: COMPLETED | OUTPATIENT
Start: 2020-05-22 | End: 2020-05-22

## 2020-05-22 RX ORDER — FAMOTIDINE 10 MG/ML
20 INJECTION INTRAVENOUS
Status: COMPLETED | OUTPATIENT
Start: 2020-05-22 | End: 2020-05-22

## 2020-05-22 RX ADMIN — MORPHINE SULFATE 2 MG: 8 INJECTION INTRAVENOUS at 04:05

## 2020-05-22 RX ADMIN — PROMETHAZINE HYDROCHLORIDE 12.5 MG: 25 INJECTION INTRAMUSCULAR; INTRAVENOUS at 04:05

## 2020-05-22 RX ADMIN — FAMOTIDINE 20 MG: 10 INJECTION INTRAVENOUS at 05:05

## 2020-05-22 NOTE — ED PROVIDER NOTES
Encounter Date: 5/22/2020       History     Chief Complaint   Patient presents with    Abdominal Pain     pt presents to ER with c/o upper abdominal pain accompanied by nausea and vomiting.  She denies any fever, diarrhea or constipation.  Was seen last week for similiar symptoms and it had resolved but pain has returned.      Patient presents complaints of severe midepigastric abdominal pain that radiates to the right flank.  Symptoms started 2:00 a.m..  Pain described as cramping and stabbing.  Patient had 2 episodes of vomiting.  No hematemesis.  No diarrhea.  She was well yesterday.  She states she ate tuna fish with mayonaise before bedtime.  She had 2 similar episodes in the last 2 weeks associated with eating.  Both episode occurred in the middle the night.  The 1st time she had drink whole milk.  The 2nd time she had eaten potato salad.  The other 2 episodes patient had diarrhea.  She was treated symptomatically and discharged home.  On the 2nd visit her liver enzymes were slightly elevated and an ultrasound of her abdomen was performed that showed a normal gallbladder and liver.  Common bile duct was also normal.  Lipase checked on the 1st vision hand it was normal.  Patient has been asymptomatic between episodes.  Patient states she took a Bentyl tonight and Pepto-Bismol without relief.  She is concerned that she might have lactose intolerance.  Patient had a child 1 month ago and is breastfeeding intermittently.  Patient has not noticed any other pattern or symptoms.  No chest pain.  No back pain.  No cough.  No fever.  No urinary symptoms.        Review of patient's allergies indicates:  No Known Allergies  Past Medical History:   Diagnosis Date    Vaginal delivery following previous caesarean section     x1     Past Surgical History:   Procedure Laterality Date    HERNIA REPAIR      Rt groin     Family History   Problem Relation Age of Onset    No Known Problems Mother     No Known Problems Father       Social History     Tobacco Use    Smoking status: Never Smoker    Smokeless tobacco: Never Used   Substance Use Topics    Alcohol use: Yes     Comment: occassional    Drug use: No     Review of Systems   Constitutional: Negative for chills, diaphoresis and fever.   Respiratory: Negative for cough and shortness of breath.    Cardiovascular: Negative for chest pain.   Gastrointestinal: Positive for abdominal pain, nausea and vomiting. Negative for blood in stool, constipation and diarrhea.   Genitourinary: Positive for flank pain. Negative for dysuria, hematuria and vaginal bleeding.   Musculoskeletal: Negative for back pain.   Neurological: Negative for headaches.       Physical Exam     Initial Vitals [05/22/20 0426]   BP Pulse Resp Temp SpO2   135/82 98 18 99.2 °F (37.3 °C) 100 %      MAP       --         Physical Exam    Nursing note and vitals reviewed.  Constitutional: She appears well-developed and well-nourished. She is not diaphoretic. No distress.   Patient appears uncomfortable   HENT:   Head: Normocephalic and atraumatic.   Nose: Nose normal.   Mouth/Throat: Oropharynx is clear and moist.   Eyes: Conjunctivae and EOM are normal. Pupils are equal, round, and reactive to light. Right eye exhibits no discharge. Left eye exhibits no discharge. No scleral icterus.   Neck: Normal range of motion. Neck supple. No tracheal deviation present.   Cardiovascular: Normal rate, regular rhythm and normal heart sounds.   No murmur heard.  Pulmonary/Chest: Breath sounds normal. No stridor. No respiratory distress. She has no wheezes. She has no rhonchi. She has no rales.   Abdominal: Soft. She exhibits no distension. There is tenderness (Mild tenderness in epigastric area.). There is no rebound and no guarding.   Mild tenderness right flank.   Musculoskeletal: Normal range of motion.   Neurological: She is alert and oriented to person, place, and time.   Skin: Skin is warm and dry.   Psychiatric: Her behavior is  normal. Judgment and thought content normal.   Patient is anxious.         ED Course   Procedures  Labs Reviewed   POCT URINE PREGNANCY   POCT CBC   POCT CMP          Imaging Results    None          Medical Decision Making:   Initial Assessment:   Patient presents with recurrence of mid epigastric abdominal pain with right flank pain.  Patient associated nausea and vomiting.  Mild to moderate tenderness midepigastric area on exam.  Peritoneal signs.  Patient is afebrile.  Will recheck liver enzymes since they or mildly elevated on recent visit.  Ultrasound results reviewed.  Symptoms may be due to lactose intolerance.  Will treat symptomatically and reassess.  Differential Diagnosis:   Lactose intolerance, indigestion, intussuseption, gallbladder dysfunction, renal colic  Clinical Tests:   Lab Tests: Ordered and Reviewed  The following lab test(s) were unremarkable: CBC, CMP, Urinalysis and UPT  Radiological Study: Ordered and Reviewed  ED Management:  0600:  No significant findings on lab work.  LFTs have improved.  Bilirubin is not elevated.  Urinalysis unremarkable.  CT does not show any acute intra-abdominal abnormalities to explain her symptoms.  Symptoms may be due to lactose intolerance.  Symptoms may also be due to gastritis or duodenitis.  Will place patient on PPI.  Patient will be referred to gastroenterology for recheck and re-evaluation.                                 Clinical Impression:       ICD-10-CM ICD-9-CM   1. Pain of upper abdomen R10.10 789.09   2. Right flank pain R10.9 789.09   3. Nausea and vomiting, intractability of vomiting not specified, unspecified vomiting type R11.2 787.01         Disposition:   Disposition: Discharged  Condition: Stable                        Matt Workman MD  05/22/20 0607

## 2020-05-22 NOTE — ED TRIAGE NOTES
Pt presents to ER with c/o upper abdominal pain accompanied by nausea and vomiting. Symptoms originally started a week ago and had resolved but now has recurrent symptoms.  She denies any fever, diarrhea or constipation.  She states it started after eating some tuna fish last night.  She is one month postpartum.

## 2021-04-15 ENCOUNTER — PATIENT MESSAGE (OUTPATIENT)
Dept: RESEARCH | Facility: HOSPITAL | Age: 36
End: 2021-04-15

## 2021-05-17 ENCOUNTER — HOSPITAL ENCOUNTER (EMERGENCY)
Facility: HOSPITAL | Age: 36
Discharge: HOME | End: 2021-05-17
Attending: EMERGENCY MEDICINE
Payer: MEDICAID

## 2021-05-17 VITALS
HEIGHT: 67 IN | BODY MASS INDEX: 29.03 KG/M2 | TEMPERATURE: 98 F | RESPIRATION RATE: 19 BRPM | HEART RATE: 91 BPM | WEIGHT: 185 LBS | SYSTOLIC BLOOD PRESSURE: 119 MMHG | DIASTOLIC BLOOD PRESSURE: 97 MMHG | OXYGEN SATURATION: 100 %

## 2021-05-17 DIAGNOSIS — B34.9 VIRAL ILLNESS: Primary | ICD-10-CM

## 2021-05-17 LAB
B-HCG UR QL: NEGATIVE
BILIRUBIN, POC UA: NEGATIVE
BLOOD, POC UA: NEGATIVE
CLARITY, POC UA: CLEAR
COLOR, POC UA: YELLOW
CTP QC/QA: YES
CTP QC/QA: YES
GLUCOSE, POC UA: NEGATIVE
INFLUENZA A ANTIGEN, POC: NEGATIVE
INFLUENZA B ANTIGEN, POC: NEGATIVE
KETONES, POC UA: NEGATIVE
LEUKOCYTE EST, POC UA: NEGATIVE
NITRITE, POC UA: NEGATIVE
PH UR STRIP: 5.5 [PH]
POC RAPID STREP A: NEGATIVE
PROTEIN, POC UA: ABNORMAL
SARS-COV-2 RDRP RESP QL NAA+PROBE: NEGATIVE
SPECIFIC GRAVITY, POC UA: 1.02
UROBILINOGEN, POC UA: 0.2 E.U./DL

## 2021-05-17 PROCEDURE — 87804 INFLUENZA ASSAY W/OPTIC: CPT | Mod: ER

## 2021-05-17 PROCEDURE — U0002 COVID-19 LAB TEST NON-CDC: HCPCS | Mod: ER | Performed by: EMERGENCY MEDICINE

## 2021-05-17 PROCEDURE — 25000003 PHARM REV CODE 250: Mod: ER | Performed by: EMERGENCY MEDICINE

## 2021-05-17 PROCEDURE — 81025 URINE PREGNANCY TEST: CPT | Mod: ER | Performed by: EMERGENCY MEDICINE

## 2021-05-17 PROCEDURE — 99283 EMERGENCY DEPT VISIT LOW MDM: CPT | Mod: ER

## 2021-05-17 PROCEDURE — 81003 URINALYSIS AUTO W/O SCOPE: CPT | Mod: ER

## 2021-05-17 RX ORDER — ONDANSETRON 2 MG/ML
4 INJECTION INTRAMUSCULAR; INTRAVENOUS
Status: DISCONTINUED | OUTPATIENT
Start: 2021-05-17 | End: 2021-05-17

## 2021-05-17 RX ORDER — ACETAMINOPHEN 500 MG
1000 TABLET ORAL
Status: COMPLETED | OUTPATIENT
Start: 2021-05-17 | End: 2021-05-17

## 2021-05-17 RX ADMIN — ACETAMINOPHEN 1000 MG: 500 TABLET, FILM COATED ORAL at 08:05

## 2021-07-18 ENCOUNTER — OFFICE VISIT (OUTPATIENT)
Dept: URGENT CARE | Facility: CLINIC | Age: 36
End: 2021-07-18
Payer: MEDICAID

## 2021-07-18 ENCOUNTER — PATIENT MESSAGE (OUTPATIENT)
Dept: ADMINISTRATIVE | Facility: CLINIC | Age: 36
End: 2021-07-18

## 2021-07-18 VITALS
TEMPERATURE: 102 F | SYSTOLIC BLOOD PRESSURE: 143 MMHG | OXYGEN SATURATION: 100 % | HEART RATE: 101 BPM | WEIGHT: 185 LBS | BODY MASS INDEX: 29.03 KG/M2 | HEIGHT: 67 IN | RESPIRATION RATE: 18 BRPM | DIASTOLIC BLOOD PRESSURE: 89 MMHG

## 2021-07-18 DIAGNOSIS — U07.1 COVID-19 VIRUS INFECTION: Primary | ICD-10-CM

## 2021-07-18 DIAGNOSIS — R50.9 FEVER, UNSPECIFIED FEVER CAUSE: ICD-10-CM

## 2021-07-18 LAB
CTP QC/QA: YES
SARS-COV-2 RDRP RESP QL NAA+PROBE: POSITIVE

## 2021-07-18 PROCEDURE — 99214 PR OFFICE/OUTPT VISIT, EST, LEVL IV, 30-39 MIN: ICD-10-PCS | Mod: S$GLB,,, | Performed by: PHYSICIAN ASSISTANT

## 2021-07-18 PROCEDURE — U0002: ICD-10-PCS | Mod: QW,CR,S$GLB, | Performed by: PHYSICIAN ASSISTANT

## 2021-07-18 PROCEDURE — U0002 COVID-19 LAB TEST NON-CDC: HCPCS | Mod: QW,CR,S$GLB, | Performed by: PHYSICIAN ASSISTANT

## 2021-07-18 PROCEDURE — 99214 OFFICE O/P EST MOD 30 MIN: CPT | Mod: S$GLB,,, | Performed by: PHYSICIAN ASSISTANT

## 2021-07-18 RX ORDER — BENZONATATE 100 MG/1
200 CAPSULE ORAL 3 TIMES DAILY PRN
Qty: 40 CAPSULE | Refills: 0 | Status: SHIPPED | OUTPATIENT
Start: 2021-07-18 | End: 2021-07-25

## 2021-07-18 RX ORDER — ALBUTEROL SULFATE 90 UG/1
2 AEROSOL, METERED RESPIRATORY (INHALATION) EVERY 4 HOURS PRN
Qty: 6.7 G | Refills: 0 | Status: SHIPPED | OUTPATIENT
Start: 2021-07-18

## 2021-07-18 RX ORDER — ACETAMINOPHEN 500 MG
1000 TABLET ORAL
Status: COMPLETED | OUTPATIENT
Start: 2021-07-18 | End: 2021-07-18

## 2021-07-18 RX ADMIN — Medication 1000 MG: at 06:07

## 2021-07-20 ENCOUNTER — TELEPHONE (OUTPATIENT)
Dept: ADMINISTRATIVE | Facility: CLINIC | Age: 36
End: 2021-07-20

## 2021-07-20 ENCOUNTER — PATIENT MESSAGE (OUTPATIENT)
Dept: ADMINISTRATIVE | Facility: OTHER | Age: 36
End: 2021-07-20

## 2021-07-21 ENCOUNTER — PATIENT MESSAGE (OUTPATIENT)
Dept: ADMINISTRATIVE | Facility: OTHER | Age: 36
End: 2021-07-21

## 2021-07-22 ENCOUNTER — PATIENT MESSAGE (OUTPATIENT)
Dept: ADMINISTRATIVE | Facility: OTHER | Age: 36
End: 2021-07-22

## 2021-07-23 ENCOUNTER — PATIENT MESSAGE (OUTPATIENT)
Dept: ADMINISTRATIVE | Facility: CLINIC | Age: 36
End: 2021-07-23

## 2021-07-23 ENCOUNTER — PATIENT MESSAGE (OUTPATIENT)
Dept: ADMINISTRATIVE | Facility: OTHER | Age: 36
End: 2021-07-23

## 2021-07-24 ENCOUNTER — PATIENT MESSAGE (OUTPATIENT)
Dept: ADMINISTRATIVE | Facility: CLINIC | Age: 36
End: 2021-07-24

## 2021-07-25 ENCOUNTER — NURSE TRIAGE (OUTPATIENT)
Dept: ADMINISTRATIVE | Facility: CLINIC | Age: 36
End: 2021-07-25

## 2021-07-30 ENCOUNTER — HOSPITAL ENCOUNTER (EMERGENCY)
Facility: OTHER | Age: 36
Discharge: HOME | End: 2021-07-30
Attending: EMERGENCY MEDICINE
Payer: MEDICAID

## 2021-07-30 VITALS
HEART RATE: 81 BPM | TEMPERATURE: 98 F | HEIGHT: 67 IN | BODY MASS INDEX: 29.07 KG/M2 | OXYGEN SATURATION: 97 % | DIASTOLIC BLOOD PRESSURE: 93 MMHG | RESPIRATION RATE: 18 BRPM | SYSTOLIC BLOOD PRESSURE: 142 MMHG | WEIGHT: 185.19 LBS

## 2021-07-30 DIAGNOSIS — S39.012A LUMBAR STRAIN, INITIAL ENCOUNTER: ICD-10-CM
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
## 2021-07-30 DIAGNOSIS — R07.89 MUSCULOSKELETAL CHEST PAIN: ICD-10-CM

## 2021-07-30 PROCEDURE — 99284 EMERGENCY DEPT VISIT MOD MDM: CPT

## 2021-07-30 RX ORDER — IBUPROFEN 800 MG/1
800 TABLET ORAL EVERY 6 HOURS PRN
Qty: 30 TABLET | Refills: 0 | Status: SHIPPED | OUTPATIENT
Start: 2021-07-30 | End: 2022-07-20

## 2021-07-30 RX ORDER — METHOCARBAMOL 500 MG/1
1000 TABLET, FILM COATED ORAL 3 TIMES DAILY PRN
Qty: 20 TABLET | Refills: 0 | Status: SHIPPED | OUTPATIENT
Start: 2021-07-30 | End: 2021-08-04

## 2022-02-24 NOTE — ED PROVIDER NOTES
Encounter Date: 5/7/2020    SCRIBE #1 NOTE: I, Malu Howard, am scribing for, and in the presence of,  Cole Nicole MD. I have scribed the following portions of the note - Other sections scribed: HPI, ROS.       History     Chief Complaint   Patient presents with    Abdominal Pain     started this morning; states she ate seafood last night that might have been bad; +n/v/d; reports being 3 weeks postpartum     CC: Abdominal pain    HPI: This is a 34 y.o. F who has no PMHx who presents to the ED via EMS transportation complaining of epigastric abdominal pain and bilateral flank pain that suddenly began after nursing her baby approximately 3 hours PTA. She states that the abdominal pain and flank pain has subsided since arrival to the ED. Pt has associated vomiting and diarrhea. She reports 3 episodes of vomiting and 2 episodes of diarrhea PTA. Pt gave birth 4/13/2020. Pt denies fever, chills, ear pain, sore throat, nasal congestion, runny nose, eye problem, cough, SOB, CP, black or bloody stool, hematemesis, dysuria, back pain, arm or leg problem, rash, or headache.    The history is provided by the patient. No  was used.     Review of patient's allergies indicates:  No Known Allergies  Past Medical History:   Diagnosis Date    Vaginal delivery following previous caesarean section     x1     Past Surgical History:   Procedure Laterality Date    HERNIA REPAIR      Rt groin     Family History   Problem Relation Age of Onset    No Known Problems Mother     No Known Problems Father      Social History     Tobacco Use    Smoking status: Never Smoker    Smokeless tobacco: Never Used   Substance Use Topics    Alcohol use: Yes     Comment: occassional    Drug use: No     Review of Systems   Constitutional: Negative for chills and fever.   HENT: Negative for congestion, ear pain, rhinorrhea and sore throat.    Eyes: Negative for pain.   Respiratory: Negative for cough and shortness of breath.   Records requested   Cardiovascular: Negative for chest pain.   Gastrointestinal: Positive for abdominal pain, diarrhea and vomiting. Negative for blood in stool.        (-) Melena  (-) Hematemesis   Genitourinary: Positive for flank pain. Negative for dysuria.   Musculoskeletal: Negative for back pain and myalgias.   Skin: Negative for rash.   Neurological: Negative for weakness.   Hematological: Does not bruise/bleed easily.       Physical Exam     Initial Vitals [05/07/20 0650]   BP Pulse Resp Temp SpO2   (!) 150/82 102 18 98.9 °F (37.2 °C) 100 %      MAP       --         Physical Exam   The patient was examined specifically for the following:   General:No significant distress, Good color, Warm and dry. Head and neck:Scalp atraumatic, Neck supple. Neurological:Appropriate conversation, Gross motor deficits. Eyes:Conjugate gaze, Clear corneas. ENT: No epistaxis. Cardiac: Regular rate and rhythm, Grossly normal heart tones. Pulmonary: Wheezing, Rales. Gastrointestinal: Abdominal tenderness, Abdominal distention. Musculoskeletal: Extremity deformity, Apparent pain with range of motion of the joints. Skin: Rash.   The findings on examination were normal except for the following: patient has minimal epigastric tenderness.  She has mild bilateral costovertebral angle tenderness.  The low abdomen is nontender.  ED Course   Procedures  Labs Reviewed   COMPREHENSIVE METABOLIC PANEL - Abnormal; Notable for the following components:       Result Value    CO2 21 (*)     Calcium 8.6 (*)     Albumin 3.4 (*)     All other components within normal limits   CBC W/ AUTO DIFFERENTIAL - Abnormal; Notable for the following components:    Hemoglobin 11.1 (*)     Hematocrit 35.0 (*)     Mean Corpuscular Hemoglobin 26.1 (*)     Mean Corpuscular Hemoglobin Conc 31.7 (*)     RDW 15.2 (*)     Gran% 78.2 (*)     Lymph% 16.6 (*)     All other components within normal limits   URINALYSIS, REFLEX TO URINE CULTURE - Abnormal; Notable for the following components:     Protein, UA 1+ (*)     All other components within normal limits    Narrative:     Preferred Collection Type->Urine, Clean Catch   LIPASE   URINALYSIS MICROSCOPIC    Narrative:     Preferred Collection Type->Urine, Clean Catch          Imaging Results    None       Medical decision making: Given the above, this patient presents to the emergency room with vomiting diarrhea and crampy high abdominal pain that began suddenly just prior to admission to the emergency room.   Laboratory evaluation is unremarkable.  I doubt pancreatitis cholestasis peritonitis.  I will treat with dicyclomine Zofran and clear liquids and have the patient follow up with primary care.  There is no low pelvic pain or tenderness.  The patient's urinalysis does not reveal any evidence of infection.                                       Clinical Impression:       ICD-10-CM ICD-9-CM   1. Vomiting and diarrhea R11.10 787.03    R19.7 787.91   2. Abdominal pain, bilateral upper quadrant R10.11 789.01    R10.12 789.02             ED Disposition Condition    Discharge Stable        ED Prescriptions     Medication Sig Dispense Start Date End Date Auth. Provider    dicyclomine (BENTYL) 20 mg tablet Take 1 tablet (20 mg total) by mouth every 6 (six) hours as needed (every 6 hours as needed for pain). 20 tablet 5/7/2020  Cole Nicole MD    ondansetron (ZOFRAN) 4 MG tablet Take 1 tablet (4 mg total) by mouth every 8 (eight) hours as needed (Nausea and vomiting). 12 tablet 5/7/2020  Cole Nicole MD        Follow-up Information     Follow up With Specialties Details Why Contact Info    Azikiwe K. Lombard, MD Family Medicine In 2 days  [address] [place] [address]  [phone]                          I personally performed the services described in this documentation.  All medical record  entries made by the scribe are at my direction and in my presence.  Signed, Dr. Corey Nicole MD  05/07/20 4056

## 2022-07-18 ENCOUNTER — HOSPITAL ENCOUNTER (EMERGENCY)
Facility: OTHER | Age: 37
Discharge: HOME | End: 2022-07-18
Attending: EMERGENCY MEDICINE
Payer: MEDICAID

## 2022-07-18 VITALS
BODY MASS INDEX: 31.39 KG/M2 | SYSTOLIC BLOOD PRESSURE: 141 MMHG | HEART RATE: 80 BPM | OXYGEN SATURATION: 100 % | WEIGHT: 200 LBS | RESPIRATION RATE: 18 BRPM | DIASTOLIC BLOOD PRESSURE: 93 MMHG | HEIGHT: 67 IN | TEMPERATURE: 98 F

## 2022-07-18 DIAGNOSIS — Z34.90 PREGNANCY AT EARLY STAGE: Primary | ICD-10-CM

## 2022-07-18 DIAGNOSIS — O26.891 ABDOMINAL PAIN DURING PREGNANCY IN FIRST TRIMESTER: Primary | ICD-10-CM

## 2022-07-18 DIAGNOSIS — R10.9 ABDOMINAL PAIN DURING PREGNANCY IN FIRST TRIMESTER: Primary | ICD-10-CM

## 2022-07-18 LAB
ALBUMIN SERPL BCP-MCNC: 3.9 G/DL (ref 3.5–5.2)
ALP SERPL-CCNC: 96 U/L (ref 55–135)
ALT SERPL W/O P-5'-P-CCNC: 47 U/L (ref 10–44)
ANION GAP SERPL CALC-SCNC: 11 MMOL/L (ref 8–16)
AST SERPL-CCNC: 24 U/L (ref 10–40)
B-HCG UR QL: POSITIVE
BASOPHILS # BLD AUTO: 0.03 K/UL (ref 0–0.2)
BASOPHILS NFR BLD: 0.5 % (ref 0–1.9)
BILIRUB SERPL-MCNC: 0.4 MG/DL (ref 0.1–1)
BILIRUB UR QL STRIP: NEGATIVE
BUN SERPL-MCNC: 9 MG/DL (ref 6–20)
CALCIUM SERPL-MCNC: 9.8 MG/DL (ref 8.7–10.5)
CHLORIDE SERPL-SCNC: 107 MMOL/L (ref 95–110)
CLARITY UR: CLEAR
CO2 SERPL-SCNC: 23 MMOL/L (ref 23–29)
COLOR UR: YELLOW
CREAT SERPL-MCNC: 0.8 MG/DL (ref 0.5–1.4)
CTP QC/QA: YES
DIFFERENTIAL METHOD: ABNORMAL
EOSINOPHIL # BLD AUTO: 0.1 K/UL (ref 0–0.5)
EOSINOPHIL NFR BLD: 0.8 % (ref 0–8)
ERYTHROCYTE [DISTWIDTH] IN BLOOD BY AUTOMATED COUNT: 13.7 % (ref 11.5–14.5)
EST. GFR  (AFRICAN AMERICAN): >60 ML/MIN/1.73 M^2
EST. GFR  (NON AFRICAN AMERICAN): >60 ML/MIN/1.73 M^2
GLUCOSE SERPL-MCNC: 100 MG/DL (ref 70–110)
GLUCOSE UR QL STRIP: NEGATIVE
HCG INTACT+B SERPL-ACNC: 261 MIU/ML
HCT VFR BLD AUTO: 40.9 % (ref 37–48.5)
HCV AB SERPL QL IA: NEGATIVE
HGB BLD-MCNC: 13 G/DL (ref 12–16)
HGB UR QL STRIP: NEGATIVE
HIV 1+2 AB+HIV1 P24 AG SERPL QL IA: NEGATIVE
IMM GRANULOCYTES # BLD AUTO: 0.02 K/UL (ref 0–0.04)
IMM GRANULOCYTES NFR BLD AUTO: 0.3 % (ref 0–0.5)
KETONES UR QL STRIP: NEGATIVE
LEUKOCYTE ESTERASE UR QL STRIP: NEGATIVE
LYMPHOCYTES # BLD AUTO: 2 K/UL (ref 1–4.8)
LYMPHOCYTES NFR BLD: 31.3 % (ref 18–48)
MCH RBC QN AUTO: 27.1 PG (ref 27–31)
MCHC RBC AUTO-ENTMCNC: 31.8 G/DL (ref 32–36)
MCV RBC AUTO: 85 FL (ref 82–98)
MONOCYTES # BLD AUTO: 0.5 K/UL (ref 0.3–1)
MONOCYTES NFR BLD: 7.5 % (ref 4–15)
NEUTROPHILS # BLD AUTO: 3.7 K/UL (ref 1.8–7.7)
NEUTROPHILS NFR BLD: 59.6 % (ref 38–73)
NITRITE UR QL STRIP: NEGATIVE
NRBC BLD-RTO: 0 /100 WBC
PH UR STRIP: 7 [PH] (ref 5–8)
PLATELET # BLD AUTO: 273 K/UL (ref 150–450)
PMV BLD AUTO: 10.1 FL (ref 9.2–12.9)
POTASSIUM SERPL-SCNC: 4.4 MMOL/L (ref 3.5–5.1)
PROT SERPL-MCNC: 8.1 G/DL (ref 6–8.4)
PROT UR QL STRIP: NEGATIVE
RBC # BLD AUTO: 4.79 M/UL (ref 4–5.4)
SODIUM SERPL-SCNC: 141 MMOL/L (ref 136–145)
SP GR UR STRIP: 1.01 (ref 1–1.03)
URN SPEC COLLECT METH UR: NORMAL
UROBILINOGEN UR STRIP-ACNC: NEGATIVE EU/DL
WBC # BLD AUTO: 6.24 K/UL (ref 3.9–12.7)

## 2022-07-18 PROCEDURE — 87389 HIV-1 AG W/HIV-1&-2 AB AG IA: CPT | Performed by: EMERGENCY MEDICINE

## 2022-07-18 PROCEDURE — 84702 CHORIONIC GONADOTROPIN TEST: CPT | Performed by: PHYSICIAN ASSISTANT

## 2022-07-18 PROCEDURE — 81025 URINE PREGNANCY TEST: CPT | Performed by: NURSE PRACTITIONER

## 2022-07-18 PROCEDURE — 81003 URINALYSIS AUTO W/O SCOPE: CPT | Performed by: NURSE PRACTITIONER

## 2022-07-18 PROCEDURE — 99284 EMERGENCY DEPT VISIT MOD MDM: CPT | Mod: 25

## 2022-07-18 PROCEDURE — 25000003 PHARM REV CODE 250: Performed by: PHYSICIAN ASSISTANT

## 2022-07-18 PROCEDURE — 86803 HEPATITIS C AB TEST: CPT | Performed by: EMERGENCY MEDICINE

## 2022-07-18 PROCEDURE — 85025 COMPLETE CBC W/AUTO DIFF WBC: CPT | Performed by: PHYSICIAN ASSISTANT

## 2022-07-18 PROCEDURE — 80053 COMPREHEN METABOLIC PANEL: CPT | Performed by: PHYSICIAN ASSISTANT

## 2022-07-18 RX ORDER — ACETAMINOPHEN 500 MG
1000 TABLET ORAL
Status: COMPLETED | OUTPATIENT
Start: 2022-07-18 | End: 2022-07-18

## 2022-07-18 RX ADMIN — ACETAMINOPHEN 1000 MG: 500 TABLET, FILM COATED ORAL at 02:07

## 2022-07-18 NOTE — ED PROVIDER NOTES
"     Source of History:  The patient    Chief complaint:  +home preg test (Pt had +home preg test today. Pt c.o lower abd cramping onset yesterday. Pt denies vaginal bleeding.  AAO x 3 nadn skin wflorecita )      HPI:  Craig De Paz is a 37 y.o. female  with 1 previous miscarriage presenting with complaint of lower abdominal cramping that started yesterday in is mild.  Denies any bleeding.  Patient's last normal menstrual.  Was at the end of May, however she states she does not have regular periods.  Denies any fevers or chills or dysuria.  She took a pregnancy test at home after having the cramping which was positive and came in for further evaluation management.    This is the extent to the patients complaints today here in the emergency department.    ROS: As per HPI and below:  Constitutional: No fever.  No chills.  Eyes: No visual changes.  ENT: No sore throat. No ear pain    Cardiovascular: No chest pain.  Respiratory: No shortness of breath.  GI: No abdominal pain.  No nausea or vomiting.  Genitourinary:  Pelvic cramping no dysuria or vaginal bleeding  Neurologic: No headache. No focal weakness.  No numbness.  MSK: no back pain.  Integument: No rashes or lesions.  Hematologic: No easy bruising.  Endocrine: No excessive thirst or urination.    Review of patient's allergies indicates:  No Known Allergies    PMH:  As per HPI and below:  Past Medical History:   Diagnosis Date    Vaginal delivery following previous caesarean section     x1     Past Surgical History:   Procedure Laterality Date    HERNIA REPAIR      Rt groin    TUMMY MARTHA         Social History     Tobacco Use    Smoking status: Never Smoker    Smokeless tobacco: Never Used   Substance Use Topics    Alcohol use: Yes     Comment: occassional    Drug use: No       Physical Exam:    BP (!) 167/92 (BP Location: Left arm, Patient Position: Sitting)   Pulse 79   Temp 98.7 °F (37.1 °C) (Oral)   Resp 18   Ht 5' 7" (1.702 m)   Wt 90.7 " kg (200 lb)   LMP  (LMP Unknown)   SpO2 99%   BMI 31.32 kg/m²   Nursing note and vital signs reviewed.  Constitutional: No acute distress.  Nontoxic  Eyes: No conjunctival injection.Extraocular muscles are intact.  ENT: Oropharynx clear.  Normal phonation.   Cardiovascular: Regular rate and rhythm.  No murmurs. No gallops. No rubs  Respiratory: Clear to auscultation bilaterally.  Good air movement.  No wheezes.  No rhonchi. No rales. No accessory muscle use..  Abdomen: Soft.  Not distended.  Nontender.  No guarding.  No rebound. Non-peritoneal.  Musculoskeletal: Good range of motion all joints.  No deformities.  Neck supple.  No meningismus.  Skin: No rashes seen.  Good turgor.  No abrasions.  No ecchymoses.  Neurologic: Motor intact.  Sensation intact.  No ataxia. No focal neurological deficits.  Psych: Appropriate, conversant    Labs that have been ordered have been independently reviewed and interpreted by myself.    I decided to obtain the patient's medical records.  Summary of Medical Records:  Looking at medical records the patient is O negative blood type    MDM/ Differential Dx:    37 y.o. female with pelvic cramping with positive pregnancy test.  By her last dates estimated gestational age would be approximately 7 weeks 4 days.  Blood work was protocol.  We will get an ultrasound to confirm intrauterine pregnancy and rule out ectopic pregnancy.  Denies any vaginal bleeding so no need for RhoGAM at this time.    ED Course as of 07/18/22 1506   Mon Jul 18, 2022   1433 Occult Blood UA: Negative [SM]   1433 NITRITE UA: Negative [SM]   1433 WBC: 6.24 [SM]   1433 Hemoglobin: 13.0 [SM]   1455 Creatinine: 0.8 [SM]   1456 Will sign out results of US to Dr Bailon for dx and dispo. []   1457 HCG Quant: 261 [SM]   1505 Discussed with OB to put patient in beta book. Also discussed the preliminary plans with the patient and expected plan. []      ED Course User Index  [] Mal Bhandari DO                  Diagnostic Impression:    1. Pregnancy at early stage                  Mal Bhandari, DO  07/18/22 1458       Mal Bhandari, DO  07/18/22 1500

## 2022-07-18 NOTE — FIRST PROVIDER EVALUATION
Emergency Department TeleTriage Encounter Note      CHIEF COMPLAINT    Chief Complaint   Patient presents with    +home preg test     Pt had +home preg test today. Pt c.o lower abd cramping onset yesterday. Pt denies vaginal bleeding.  AAO x 3 nadn skin w.d        VITAL SIGNS   Initial Vitals [22 1251]   BP Pulse Resp Temp SpO2   (!) 167/92 79 18 98.7 °F (37.1 °C) 99 %      MAP       --            ALLERGIES    Review of patient's allergies indicates:  No Known Allergies    PROVIDER TRIAGE NOTE  This is a teletriage evaluation of a 37 y.o. female presenting to the ED with c/o pos UPT at home est 5w6d by LMP. +constant cramping since last night without bleeding.  h/o miscarriage. O neg t&s.    PE:. Non-toxic/well-appearing. No respiratory distress, speaks in full sentences without issue. No active emesis nor cough. Normal eye contact and mentation.     Plan: labs, tylenol. Further/augmented workup at discretion of examining provider.     All ED beds are full at present; patient notified of this status.  Patient seen and medically screened by [facility] via teletriage. Orders initiated at triage to expedite care.  Patient is stable and will be placed in an ED bed when available.  Care will be transferred to an alternate provider when patient has been placed in an Exam Room further exam, additional orders, and disposition.         ORDERS  Labs Reviewed   POCT URINE PREGNANCY - Abnormal; Notable for the following components:       Result Value    POC Preg Test, Ur Positive (*)     All other components within normal limits   URINALYSIS, REFLEX TO URINE CULTURE       ED Orders (720h ago, onward)    Start Ordered     Status Ordering Provider    22 1259 22 1258  POCT urine pregnancy  Once         Final result TIM SHETH    22 1259 22 1258  Urinalysis, Reflex to Urine Culture Urine, Clean Catch  STAT         In process UPTIM GRAY            Virtual Visit Note: The provider triage  portion of this emergency department evaluation and documentation was performed via [employer], a HIPAA-compliant telemedicine application, in concert with a tele-presenter in the room. A face to face patient evaluation with one of my colleagues will occur once the patient is placed in an emergency department room.      DISCLAIMER: This note was prepared with [employer] voice recognition transcription software. Garbled syntax, mangled pronouns, and other bizarre constructions may be attributed to that software system.

## 2022-07-18 NOTE — ED TRIAGE NOTES
with positive pregnancy test today. States LMP May 27, 2022n with irregular bleeding in . Does report mild cramping, denies vaginal bleeding, n/v.    Two patient identifiers have been checked and are correct.      Appearance: Pt awake, alert & oriented to person, place & time. Pt in no acute distress at present time. Pt is clean and well groomed with clothes appropriately fastened.   Skin: Skin warm, dry & intact. Color consistent with ethnicity. Mucous membranes moist. No breakdown or brusing noted.   Musculoskeletal: Patient moving all extremities well, no obvious swelling or deformities noted.   Respiratory: Respirations spontaneous, even, and non-labored. Visible chest rise noted. Airway is open and patent. No accessory muscle use noted.   Neurologic: Sensation is intact. Speech is clear and appropriate. Eyes open spontaneously, behavior appropriate to situation, follows commands, facial expression symmetrical, bilateral hand grasp equal and even, purposeful motor response noted.  Cardiac: All peripheral pulses present. No Bilateral lower extremity edema. Cap refill is <3 seconds.  Abdomen: Abdomen soft, non-tender to palpation.   : Pt reports no dysuria or hematuria.

## 2022-07-18 NOTE — CONSULTS
[facility] Emergency Dept  Obstetrics & Gynecology  Consult Note    Patient Name: Craig De Paz  MRN: [id number]  Admission Date: 2022  Hospital Length of Stay: 0 days  Code Status: Prior  Primary Care Provider: Primary Doctor No  Principal Problem: <principal problem not specified>    Inpatient consult to Obstetrics  Consult performed by: Ludmila Ramires MD  Consult ordered by: Stefan Bailon MD        Subjective:     Chief Complaint: pelvic cramping    History of Present Illness: Craig De Paz is a 37 y.o.  who presenting to the Ed for mild pelvic cramps and a positive UPT. Pt had lower abdominal cramping starting yesterday that was mild to moderate. She presented to the ED after + at home pregnancy test to be evaluated. Pt denies vaginal bleeding, severe abdominal pain, nausea, vomiting, or syncope. Patient endorses having irregular menstrual cycles. Last full menstrual cycle was 22. Patient then bled for two days on -. Patient has no history of STDs and has only one sexual partner. She is not on contraception but states she wants a mirena.     No current facility-administered medications on file prior to encounter.     Current Outpatient Medications on File Prior to Encounter   Medication Sig    albuterol (PROVENTIL/VENTOLIN HFA) 90 mcg/actuation inhaler Inhale 2 puffs into the lungs every 4 (four) hours as needed for Wheezing. Rescue    dicyclomine (BENTYL) 20 mg tablet Take 1 tablet (20 mg total) by mouth every 6 (six) hours as needed (every 6 hours as needed for pain). (Patient not taking: Reported on 2021)    ibuprofen (ADVIL,MOTRIN) 800 MG tablet Take 1 tablet (800 mg total) by mouth every 6 (six) hours as needed for Pain.    ondansetron (ZOFRAN-ODT) 4 MG TbDL Take 1 tablet (4 mg total) by mouth every 8 (eight) hours as needed. (Patient not taking: Reported on 2021)    pantoprazole (PROTONIX) 20 MG tablet Take 1 tablet (20 mg total) by mouth  once daily. for 20 doses    promethazine (PHENERGAN) 25 MG tablet Take 0.5 tablets (12.5 mg total) by mouth every 6 (six) hours as needed for Nausea. (Patient not taking: Reported on 2021)    pulse oximeter (PULSE OXIMETER) device by Apply Externally route 2 (two) times a day. Use twice daily at 8 AM and 3 PM and record the value in MyChart as directed.    ranitidine (ZANTAC) 150 MG capsule Take 1 capsule (150 mg total) by mouth nightly.       Review of patient's allergies indicates:  No Known Allergies    Past Medical History:   Diagnosis Date    Vaginal delivery following previous caesarean section     x1     OB History    Para Term  AB Living   3 1 1 0 0 1   SAB IAB Ectopic Multiple Live Births   0 0 0 0 1      # Outcome Date GA Lbr Nicholas/2nd Weight Sex Delivery Anes PTL Lv   3 Current            2 Term     M Vag-Spont   GIFTY   1               Past Surgical History:   Procedure Laterality Date    HERNIA REPAIR      Rt groin    TUMMY TUCK       Family History     Problem Relation (Age of Onset)    No Known Problems Mother, Father        Tobacco Use    Smoking status: Never Smoker    Smokeless tobacco: Never Used   Substance and Sexual Activity    Alcohol use: Yes     Comment: occassional    Drug use: No    Sexual activity: Yes     Partners: Male     Review of Systems   Constitutional: Negative for chills, fatigue and fever.   Eyes: Negative for visual disturbance.   Respiratory: Negative for cough, shortness of breath and wheezing.    Cardiovascular: Negative for chest pain.   Genitourinary: Positive for pelvic pain. Negative for vaginal bleeding.   Neurological: Negative for syncope.   Hematological: Negative for adenopathy.   Psychiatric/Behavioral: Negative for depression.     Objective:     Vital Signs (Most Recent):  Temp: 98 °F (36.7 °C) (22 151)  Pulse: 80 (22 151)  Resp: 18 (22 1251)  BP: (!) 141/93 (22 151)  SpO2: 100 % (22 151) Vital  Signs (24h Range):  Temp:  [98 °F (36.7 °C)-98.7 °F (37.1 °C)] 98 °F (36.7 °C)  Pulse:  [79-80] 80  Resp:  [18] 18  SpO2:  [99 %-100 %] 100 %  BP: (141-167)/(92-93) 141/93     Weight: 90.7 kg (200 lb)  Body mass index is 31.32 kg/m².  No LMP recorded (lmp unknown). Patient is pregnant.    Physical Exam:   Constitutional: She is oriented to person, place, and time. She appears well-developed and well-nourished.    HENT:   Head: Normocephalic.      Cardiovascular: Normal rate.     Pulmonary/Chest: Effort normal.        Abdominal: There is no guarding.   Mildly tender to palpation in the LLQ     Genitourinary:    Genitourinary Comments: Pelvic exam deferred             Musculoskeletal: Normal range of motion.       Neurological: She is alert and oriented to person, place, and time.    Skin: Skin is warm and dry.    Psychiatric: She has a normal mood and affect.       Laboratory:  Lab Results   Component Value Date    WBC 6.24 2022    HGB 13.0 2022    HCT 40.9 2022    MCV 85 2022     2022     [facility]    Diagnostic Results:  Narrative:     EXAMINATION:   US OB <14 WEEKS, TRANSABDOM & TRANSVAG, SINGLE GESTATION (XPD)     CLINICAL HISTORY:   cramping in pregnancy;     TECHNIQUE:   Transabdominal sonography of the pelvis was performed, followed by transvaginal sonography to better evaluate the uterus and ovaries.     COMPARISON:   None.     FINDINGS:   Intrauterine gestation(s): None     Endometrium measures 0.7 cm in thickness.     Right ovary: Normal.     Left ovary: Normal.  Noncompressible complex hypoechoic focus in the left adnexa measures 1.7 cm.  Focus is separate from the ovary.     Miscellaneous: No Free Fluid.    Impression:       No intrauterine pregnancy identified.  Noncompressible complex hypoechoic structure in the left adnexa suspicious for ectopic pregnancy.  Correlation with laboratory values advised.     Findings discussed with Dr. Bailon via telephone on  2022 at the time of dictation.       Electronically signed by: Birgit Lopes   Date: 2022   Time: 16:04       Assessment/Plan:     Assessment: Craig De Paz is a 37 y.o.  who presents for mild abdominal cramping in the setting of a positive UPT. VSS stable. CBC within normal range. Mild tenderness to palpation in LLQ. bHCG 261. TVUS showing non-compressible complex hypoechoic structure in left adnexa, suspicious for ectopic pregnancy. Given one equivocal bHCG, stable vital signs and cbc, and benign abdominal exam, patient is not a surgical candidate or a candidate for MTX treatment at this time. We will follow up bHCG out patient. Counseled patient on PUL and importance of f/u bHCG. Strict return precautions discussed with patient. Patient sees primary ob/gyn in [place] but would like to switch to [facility] for primary ob/gyn care.     Plan:   -Serial bHCG, next one being 22 given that we cannot identify an ectopic pregnancy of one bHCG value below discriminatory zone.   - Will schedule follow-up in [facility].     Thank you for your consult. I will sign off. Please contact us if you have any additional questions.    Ludmila Ramires MD  Obstetrics & Gynecology  [facility] - Emergency Dept

## 2022-07-18 NOTE — DISCHARGE INSTRUCTIONS
Someone from OB will call you to let you know when and where to come back for your repeat blood levels.

## 2022-07-19 NOTE — PROVIDER PROGRESS NOTES - EMERGENCY DEPT.
Encounter Date: 7/18/2022    ED Physician Progress Notes        Physician Note:   Patient signed out by Dr. Bhandari pending ultrasound, she initially presented with lower abdominal cramping and recent positive home pregnancy test.  Initial workup with beta-hCG 261, no other concerning findings.   Ultrasound did show left adnexal complex structure suspicious for ectopic pregnancy.  Patient does located pain to left pelvic area though no acute abdomen and no free fluid on ultrasound.  Gyn consulted and no indication for methotrexate or surgical management at this time, will arrange close follow-up for serial beta HCG next in 2 days and serial ultrasounds.  She is comfortable with this discharge plan and understands ED return precautions for any worsening pain or other concerns.

## 2022-07-20 ENCOUNTER — HOSPITAL ENCOUNTER (EMERGENCY)
Facility: OTHER | Age: 37
Discharge: HOME | End: 2022-07-20
Attending: EMERGENCY MEDICINE
Payer: MEDICAID

## 2022-07-20 VITALS
HEIGHT: 67 IN | BODY MASS INDEX: 31.39 KG/M2 | WEIGHT: 200 LBS | RESPIRATION RATE: 18 BRPM | DIASTOLIC BLOOD PRESSURE: 84 MMHG | SYSTOLIC BLOOD PRESSURE: 139 MMHG | TEMPERATURE: 98 F | HEART RATE: 67 BPM | OXYGEN SATURATION: 98 %

## 2022-07-20 DIAGNOSIS — O20.9 VAGINAL BLEEDING AFFECTING EARLY PREGNANCY: ICD-10-CM

## 2022-07-20 DIAGNOSIS — O36.80X0 PREGNANCY OF UNKNOWN ANATOMIC LOCATION: Primary | ICD-10-CM

## 2022-07-20 LAB
ABO + RH BLD: ABNORMAL
ALBUMIN SERPL BCP-MCNC: 3.9 G/DL (ref 3.5–5.2)
ALP SERPL-CCNC: 86 U/L (ref 55–135)
ALT SERPL W/O P-5'-P-CCNC: 46 U/L (ref 10–44)
ANION GAP SERPL CALC-SCNC: 15 MMOL/L (ref 8–16)
AST SERPL-CCNC: 30 U/L (ref 10–40)
B-HCG UR QL: POSITIVE
BASOPHILS # BLD AUTO: 0.03 K/UL (ref 0–0.2)
BASOPHILS NFR BLD: 0.5 % (ref 0–1.9)
BILIRUB SERPL-MCNC: 0.5 MG/DL (ref 0.1–1)
BLD GP AB SCN CELLS X3 SERPL QL: ABNORMAL
BLOOD GROUP ANTIBODIES SERPL: NORMAL
BUN SERPL-MCNC: 6 MG/DL (ref 6–20)
CALCIUM SERPL-MCNC: 9.5 MG/DL (ref 8.7–10.5)
CHLORIDE SERPL-SCNC: 105 MMOL/L (ref 95–110)
CO2 SERPL-SCNC: 18 MMOL/L (ref 23–29)
CREAT SERPL-MCNC: 0.8 MG/DL (ref 0.5–1.4)
CTP QC/QA: YES
DIFFERENTIAL METHOD: ABNORMAL
EOSINOPHIL # BLD AUTO: 0.1 K/UL (ref 0–0.5)
EOSINOPHIL NFR BLD: 0.9 % (ref 0–8)
ERYTHROCYTE [DISTWIDTH] IN BLOOD BY AUTOMATED COUNT: 13.8 % (ref 11.5–14.5)
EST. GFR  (AFRICAN AMERICAN): >60 ML/MIN/1.73 M^2
EST. GFR  (NON AFRICAN AMERICAN): >60 ML/MIN/1.73 M^2
GLUCOSE SERPL-MCNC: 84 MG/DL (ref 70–110)
HCG INTACT+B SERPL-ACNC: 863 MIU/ML
HCT VFR BLD AUTO: 40.3 % (ref 37–48.5)
HGB BLD-MCNC: 12.8 G/DL (ref 12–16)
IMM GRANULOCYTES # BLD AUTO: 0.02 K/UL (ref 0–0.04)
IMM GRANULOCYTES NFR BLD AUTO: 0.3 % (ref 0–0.5)
INJECT RH IG VOL PATIENT: NORMAL ML
LYMPHOCYTES # BLD AUTO: 2.7 K/UL (ref 1–4.8)
LYMPHOCYTES NFR BLD: 39.8 % (ref 18–48)
MCH RBC QN AUTO: 27.2 PG (ref 27–31)
MCHC RBC AUTO-ENTMCNC: 31.8 G/DL (ref 32–36)
MCV RBC AUTO: 86 FL (ref 82–98)
MONOCYTES # BLD AUTO: 0.5 K/UL (ref 0.3–1)
MONOCYTES NFR BLD: 7.8 % (ref 4–15)
NEUTROPHILS # BLD AUTO: 3.4 K/UL (ref 1.8–7.7)
NEUTROPHILS NFR BLD: 50.7 % (ref 38–73)
NRBC BLD-RTO: 0 /100 WBC
PLATELET # BLD AUTO: 269 K/UL (ref 150–450)
PMV BLD AUTO: 10.3 FL (ref 9.2–12.9)
POTASSIUM SERPL-SCNC: 3.9 MMOL/L (ref 3.5–5.1)
PROT SERPL-MCNC: 8 G/DL (ref 6–8.4)
RBC # BLD AUTO: 4.7 M/UL (ref 4–5.4)
RH BLD: NORMAL
SODIUM SERPL-SCNC: 138 MMOL/L (ref 136–145)
WBC # BLD AUTO: 6.65 K/UL (ref 3.9–12.7)

## 2022-07-20 PROCEDURE — 86901 BLOOD TYPING SEROLOGIC RH(D): CPT | Performed by: NURSE PRACTITIONER

## 2022-07-20 PROCEDURE — 96372 THER/PROPH/DIAG INJ SC/IM: CPT | Mod: 59 | Performed by: NURSE PRACTITIONER

## 2022-07-20 PROCEDURE — 63600519 RHOGAM PHARM REV CODE 636 ALT 250 W HCPCS: Performed by: NURSE PRACTITIONER

## 2022-07-20 PROCEDURE — 25000003 PHARM REV CODE 250: Performed by: PHYSICIAN ASSISTANT

## 2022-07-20 PROCEDURE — 86886 COOMBS TEST INDIRECT TITER: CPT | Performed by: NURSE PRACTITIONER

## 2022-07-20 PROCEDURE — 86905 BLOOD TYPING RBC ANTIGENS: CPT | Performed by: NURSE PRACTITIONER

## 2022-07-20 PROCEDURE — 25000003 PHARM REV CODE 250: Performed by: NURSE PRACTITIONER

## 2022-07-20 PROCEDURE — 81025 URINE PREGNANCY TEST: CPT | Performed by: NURSE PRACTITIONER

## 2022-07-20 PROCEDURE — 86901 BLOOD TYPING SEROLOGIC RH(D): CPT | Mod: 91 | Performed by: NURSE PRACTITIONER

## 2022-07-20 PROCEDURE — 84702 CHORIONIC GONADOTROPIN TEST: CPT | Performed by: PHYSICIAN ASSISTANT

## 2022-07-20 PROCEDURE — 96360 HYDRATION IV INFUSION INIT: CPT

## 2022-07-20 PROCEDURE — 85025 COMPLETE CBC W/AUTO DIFF WBC: CPT | Performed by: PHYSICIAN ASSISTANT

## 2022-07-20 PROCEDURE — 86870 RBC ANTIBODY IDENTIFICATION: CPT | Performed by: NURSE PRACTITIONER

## 2022-07-20 PROCEDURE — 99284 EMERGENCY DEPT VISIT MOD MDM: CPT | Mod: 25

## 2022-07-20 PROCEDURE — 80053 COMPREHEN METABOLIC PANEL: CPT | Performed by: PHYSICIAN ASSISTANT

## 2022-07-20 RX ORDER — ACETAMINOPHEN 500 MG
1000 TABLET ORAL
Status: COMPLETED | OUTPATIENT
Start: 2022-07-20 | End: 2022-07-20

## 2022-07-20 RX ADMIN — HUMAN RHO(D) IMMUNE GLOBULIN 300 MCG: 300 INJECTION, SOLUTION INTRAMUSCULAR at 05:07

## 2022-07-20 RX ADMIN — SODIUM CHLORIDE 1000 ML: 0.9 INJECTION, SOLUTION INTRAVENOUS at 03:07

## 2022-07-20 RX ADMIN — ACETAMINOPHEN 1000 MG: 500 TABLET, FILM COATED ORAL at 03:07

## 2022-07-20 RX ADMIN — ACETAMINOPHEN 1000 MG: 500 TABLET, FILM COATED ORAL at 06:07

## 2022-07-20 NOTE — ED TRIAGE NOTES
"Pt arrived with c/o vaginal spotting snce about 10:45 am today.  Pt states she was seen for abd cramping 2 days ago, was told she "had no intrauterine pregnancy."  Pt went to lab to get repeat hcg today, was told her levels increased.  Pt states when she got home, she started spotting, had no vaginal bleeding before today.  Pt states she has only used one pad today since the bleeding started.  Pt endorses intermittent abd cramping.  Respirations even and unlabored.  aao x 4.  "

## 2022-07-20 NOTE — FIRST PROVIDER EVALUATION
" Emergency Department TeleTriage Encounter Note      CHIEF COMPLAINT    Chief Complaint   Patient presents with    Vaginal Bleeding     Came in for abd cramping 2 days ago, was told she had "no intra-uterine pregnancy."  Went to lab today for repeat hcg.  Started having vaginal bleeding when she came home, used 1 panty liner.  +abd cramping.       VITAL SIGNS   Initial Vitals [07/20/22 1119]   BP Pulse Resp Temp SpO2   (!) 149/85 75 18 98.4 °F (36.9 °C) 100 %      MAP       --            ALLERGIES    Review of patient's allergies indicates:  No Known Allergies    PROVIDER TRIAGE NOTE  This is a teletriage evaluation of a 37 y.o. female presenting to the ED with c/o recurrence of cramping, spotting in early pregnancy. Recent US concerning for ectopic, rising beta HCG.     PE:. Non-toxic/well-appearing. No respiratory distress, speaks in full sentences without issue. No active emesis nor cough. Normal eye contact and mentation.     Plan: labs, OB consult. Further/augmented workup at discretion of examining provider.     All ED beds are full at present; patient notified of this status.  Patient seen and medically screened by ROMA via teletriage. Orders initiated at triage to expedite care.  Patient is stable and will be placed in an ED bed when available.  Care will be transferred to an alternate provider when patient has been placed in an Exam Room further exam, additional orders, and disposition.         ORDERS  Labs Reviewed - No data to display    ED Orders (720h ago, onward)    None            Virtual Visit Note: The provider triage portion of this emergency department evaluation and documentation was performed via [employer], a HIPAA-compliant telemedicine application, in concert with a tele-presenter in the room. A face to face patient evaluation with one of my colleagues will occur once the patient is placed in an emergency department room.      DISCLAIMER: This note was prepared with M*Modal voice " recognition transcription software. Garbled syntax, mangled pronouns, and other bizarre constructions may be attributed to that software system.

## 2022-07-20 NOTE — ED PROVIDER NOTES
"     Source of History:  Patient    Chief complaint:  Vaginal Bleeding (Came in for abd cramping 2 days ago, was told she had "no intra-uterine pregnancy."  Went to lab today for repeat hcg.  Started having vaginal bleeding when she came home, used 1 panty liner.  +abd cramping.)      HPI:  Craig De Paz is a 37 y.o. female  approximately 7 weeks pregnant by LMP presenting with new onset of vaginal spotting after being seen in this ED 2 days ago and being diagnosed with pregnancy of unknown anatomic location.  Patient was placed on the beta book.  She had her repeat beta done this morning and it increased.  Then she reports an onset of light vaginal spotting.  She reports a small amount, only enough to saturate a panty liner but was told that if she has any bleeding to present to the emergency room for further evaluation.  She denies abdominal pain and cramping, nausea and vomiting.    This is the extent to the patients complaints today here in the emergency department.    ROS: As per HPI and below:  General: No fever.  No chills.  Eyes: No visual changes.  ENT: No sore throat. No ear pain  Head: No headache.    Chest: No shortness of breath.  Cardiovascular: No chest pain.  Abdomen: No abdominal pain.  No nausea or vomiting.  Genito-Urinary: No abnormal urination. +vagianl spotting  Neurologic: No focal weakness.  No numbness.  MSK: no back pain.  Integument: No rashes or lesions.  Hematologic: No easy bruising.  Endocrine: No excessive thirst or urination.    Review of patient's allergies indicates:  No Known Allergies    PMH:  As per HPI and below:  Past Medical History:   Diagnosis Date    Vaginal delivery following previous caesarean section     x1     Past Surgical History:   Procedure Laterality Date    HERNIA REPAIR      Rt groin    TUMMY MARTHA         Social History     Tobacco Use    Smoking status: Never Smoker    Smokeless tobacco: Never Used   Substance Use Topics    Alcohol use: Yes " "    Comment: occassional    Drug use: No       Physical Exam:    /84 (BP Location: Left arm, Patient Position: Lying)   Pulse 67   Temp 97.6 °F (36.4 °C) (Oral)   Resp 18   Ht 5' 7" (1.702 m)   Wt 90.7 kg (200 lb)   LMP  (LMP Unknown)   SpO2 98%   BMI 31.32 kg/m²   Nursing note and vital signs reviewed.  Appearance: No acute distress.  Eyes: No conjunctival injection.  ENT: Oropharynx clear.    Chest/ Respiratory: Clear to auscultation bilaterally.  Good air movement.  No wheezes.  No rhonchi. No rales. No accessory muscle use.  Cardiovascular: Regular rate and rhythm.  No murmurs. No gallops. No rubs.  Abdomen: Soft.  Not distended.  Nontender.  No guarding.  No rebound. Non-peritoneal.  :  Exam done in the presence of female chaperone.  External genitalia without lesions mass or tenderness.  Patient tender on speculum exam but no significant bleeding noted in the vaginal vault.  Multi white discharge noted.  Os is closed.  Cervix is pink.  No CMT or adnexal tenderness on bimanual exam.  Musculoskeletal: Good range of motion all joints.  No deformities.  Neck supple.  No meningismus.  Skin: No rashes seen.  Good turgor.  No abrasions.  No ecchymoses.  Neurologic: Motor intact.  Sensation intact.  Cerebellar intact.  Cranial nerves intact.  Mental Status:  Alert and oriented x 3.  Appropriate, conversant    Labs that have been ordered have been independently reviewed and interpreted by myself.    I decided to obtain the patient's medical records.  Summary of Medical Records:  TVUS 7/18/22: No intrauterine pregnancy identified.  Noncompressible complex hypoechoic structure in the left adnexa suspicious for ectopic pregnancy.  Correlation with laboratory values advised.    Labs Reviewed   CBC W/ AUTO DIFFERENTIAL - Abnormal; Notable for the following components:       Result Value    MCHC 31.8 (*)     All other components within normal limits   COMPREHENSIVE METABOLIC PANEL - Abnormal; Notable for the " following components:    CO2 18 (*)     ALT 46 (*)     All other components within normal limits   POCT URINE PREGNANCY - Abnormal; Notable for the following components:    POC Preg Test, Ur Positive (*)     All other components within normal limits   TYPE & SCREEN - Abnormal; Notable for the following components:    Indirect Candida POS (*)     All other components within normal limits   HCG, QUANTITATIVE   HCG, QUANTITATIVE   RH TYPING   ANTIBODY IDENTIFICATION   PREPARE RH IMMUNE GLOBULIN       Imaging Results          US OB <14 Wks TransAbd & TransVag, Single Gestation (XPD) (Final result)  Result time 07/20/22 17:00:06    Final result by Birgit Lopes MD (07/20/22 17:00:06)                 Impression:      Stable complex focus in the left adnexa for which ectopic pregnancy is not excluded.  Tiny subcentimeter anechoic focus in the endometrial canal is nonspecific.  Continued close follow-up advised.      Electronically signed by: Birgit Lopes  Date:    07/20/2022  Time:    17:00             Narrative:    EXAMINATION:  US OB <14 WEEKS, TRANSABDOM & TRANSVAG, SINGLE GESTATION (XPD)    CLINICAL HISTORY:  uptrending beta, concern for ectopic 2 days ago, new onset of vaginal bleeding;    TECHNIQUE:  Transabdominal sonography of the pelvis was performed, followed by transvaginal sonography to better evaluate the uterus and ovaries.    COMPARISON:  07/18/2022    FINDINGS:  There is now a tiny anechoic focus in the endometrial canal measuring 0.3 cm.    Right ovary: Normal.    Left ovary: Normal.  There remains a complex hypoechoic focus in the adnexa which is similar in size.    Miscellaneous: No Free Fluid.                                Initial Impression/ Differential Dx:  Urgent evaluation of 37 y.o. female presenting with light vaginal spotting after being diagnosed with pregnancy of unknown anatomic location 2 days ago in this ER.  Patient is afebrile, not toxic appearing and hemodynamically stable.   Abdominal exam benign.  Chart review shows patient is O negative.  Given reported vaginal bleeding, plan for RhoGAM, will type and screen.  Will also get repeat ultrasound and labs and continue to reassess.    Differential Diagnosis includes, but is not limited to:  Pregnancy complication (threatened AB, inevitable AB, incomplete Ab, missed AB, uterine rupture, ectopic pregnancy, placental abruption, placenta previa), ovarian cyst/torsion, STD, foreign body, trauma, normal menstruation.      MDM:        ED Course as of 07/20/22 2023 Wed Jul 20, 2022   1442 BP(!): 149/85 [AT]   1442 Temp: 98.4 °F (36.9 °C) [AT]   1442 Temp src: Oral [AT]   1442 Pulse: 75 [AT]   1442 Resp: 18 [AT]   1442 SpO2: 100 % [AT]   1545 Preg Test, Ur(!): Positive [CU]   1649 Group & Rh: O NEG  RhoGAM ordered [CU]   1650 CBC Auto Differential(!)  No leukocytosis, stable H&H [CU]   1705 US OB <14 Wks TransAbd & TransVag, Single Gestation (XPD)  Stable complex focus in the left adnexa for which ectopic pregnancy is not excluded. [CU]   1705 Comprehensive metabolic panel(!)  Grossly unremarkable [CU]   1730 Discussed with OBGYN who will review workup and give recommendations. [CU]   1731 HCG Quant: 863  BHCG 864. This am at 9:30am BHCG 684. 2 days ago 261 [CU]   1835 Discussed the case with Dr. Marrufo from OBGYN.  Vitals are stable.  Patient has a benign abdomen.  H&H is stable.  Appropriate for discharge home with repeat beta in 48 hours.  Patient given strict bleeding and pain precautions and discharged home in good condition with OBGYN follow-up.  Patient is amenable to this plan. Patient educated on on signs and symptoms to monitor for and when to return to ED. Patient verbalized understanding agrees with treatment plan. All questions and concerns addressed.        [CU]      ED Course User Index  [AT] AMRIK Colón  [CU] Yasir Saucedo NP                   Diagnostic Impression:    1. Pregnancy of unknown anatomic location    2.  Vaginal bleeding affecting early pregnancy         ED Disposition Condition    Discharge Good          ED Prescriptions     None        Follow-up Information     Follow up With Specialties Details Why Contact Info    Diane Harding MD Obstetrics and Gynecology Schedule an appointment as soon as possible for a visit   [address] [facility] [address]  [phone]             Yasir Saucedo NP  07/20/22 2023

## 2022-07-21 ENCOUNTER — PATIENT OUTREACH (OUTPATIENT)
Dept: EMERGENCY MEDICINE | Facility: OTHER | Age: 37
End: 2022-07-21
Payer: MEDICAID

## 2022-07-21 ENCOUNTER — TELEPHONE (OUTPATIENT)
Dept: OBSTETRICS AND GYNECOLOGY | Facility: CLINIC | Age: 37
End: 2022-07-21
Payer: MEDICAID

## 2022-07-21 NOTE — PROGRESS NOTES
Arely Sun LPN  ED Navigator  Emergency Department    Project: [facility] ED Navigator  Role: Community Health Worker    Date: 07/21/2022  Patient Name: Craig De Paz  MRN: [id number]  PCP: Primary Doctor No    Assessment:     Craig De Paz is a 37 y.o. female who has presented to ED for Vaginal Bleeding. Patient has visited the ED 2 times in the past 3 months. Patient did not contact PCP.     ED Navigator Initial Assessment    ED Navigator Enrollment Documentation  Consent to Services  Does patient consent to completing the assessment?: Yes  Contact  Method of Initial Contact: Phone  Transportation  Does the patient have issues with Transportation?: No  Does the patient have transportation to and from healthcare appointments?: Yes  Insurance Coverage  Do you have coverage/adequate coverage?: Yes  [facility]kind of [facility]  Is patient able to afford co-pays/deductibles?: Yes  Is patient able to afford HME or supplies?: Yes  Does patient have an established [facility] PCP?: Yes  Able to access?: Yes  Does the patient have a lack of adequate coverage?: No  Specialist Appointment  Did the patient come to the ED to see a specialist?: Yes  Does the patient have a pending specialist referral?: Yes  Does the patient have a specialist appointment made?: No  PCP Follow Up Appointment  Has the patient had an appointment with a primary care provider in the past year?: Yes  Approximate date: 6/15/22  Provider: Florence Alves, DO  Does the patient have a follow up appontment with a PCP?: Yes  Upcoming appointment date: 8/4/22  Provider: Florence Alves, DO  When was the last time you saw your PCP?: 6/15/22  Medications  Is patient able to afford medication?: Yes  Is patient unable to get medication due to lack of transportation?: No  Psychological  Does the patient have psycho-social concerns?: No  Food  Does the patient have concerns about food?:  No  Communication/Education  Does the patient have limited English proficiency/English not primary language?: No  Does patient have low literacy and/or low health literacy?: Yes  Does patient have concerns with care?: No  Does patient have dissatisfaction with care?: No  Other Financial Concerns  Does the patient have immediate financial distress?: No  Does the patient have general financial concerns?: No  Other Social Barriers/Concerns  Does the patient have any additional barriers or concerns?: None  Primary Barrier  Barriers identified: Cognitive barrier (health literacy, language and communication, etc.)  Root Cause of ED Utilization: Patient Knowledge/Low Health Literacy  Plan to address Patient Knowledge/Low Health Literacy: Provided information for [facility] On [facility] 24/7 Nurse triage line [phone] or 1-866-[facility] ([phone])  Next steps: Provided Education  Was education/educational materials provided surrounding PCP services/creating a medical home?: Yes Was education verbal or written?: Written   Was education/educational materials provided surrounding low cost, healthy foods?: Yes Was education verbal or written?: Written   Was education/educational materials provided surrounding other items? If so, use comment to explain.: Yes (Comment: OB clinic info to call back, [place] Virtual Visit Flyer, PCP scheduling #, [facility], Eating Healthy Plate, RN on call #) Was education verbal or written?: Written   Plan: Provided information for [facility] 24/7 Nurse triage line, [phone] or 1-866-[facility] ([phone])  Expected Date of Follow Up 1: 9/21/22  Additional Documentation: Spoke with patient today and she was agreeable to enrollment and subsequent F/U calls. Pt. Denies any psychosocial needs at this time, but would like [facility] info in case she needs it in the future. Pt. Is established with Dr. Alves and stated she has a F/U scheduled 8/4/22. Last seen by  PCP 6/15/22. Pt. Denies smoking. Pt. Drinks only on occasion socially. Pt. Denied the need for any resources in the community at this time. [facility] form, [place] Virtual Visit Flyer, [facility] on call RN#, and Heart Healthy Diet education all sent to email. Will F/U in a couple of months with patient since she denied any needs at this time.         Social History     Socioeconomic History    Marital status: Single   Tobacco Use    Smoking status: Never Smoker    Smokeless tobacco: Never Used   Substance and Sexual Activity    Alcohol use: Yes     Comment: occassional    Drug use: No    Sexual activity: Yes     Partners: Male     Social Determinants of Health     Financial Resource Strain: Low Risk     Difficulty of Paying Living Expenses: Not hard at all   Food Insecurity: No Food Insecurity    Worried About Running Out of Food in the Last Year: Never true    Ran Out of Food in the Last Year: Never true   Transportation Needs: No Transportation Needs    Lack of Transportation (Medical): No    Lack of Transportation (Non-Medical): No   Stress: No Stress Concern Present    Feeling of Stress : Not at all   Social Connections: Unknown    Frequency of Communication with Friends and Family: Three times a week    Frequency of Social Gatherings with Friends and Family: Once a week    Marital Status: Never    Housing Stability: Unknown    Unable to Pay for Housing in the Last Year: No    Unstable Housing in the Last Year: No       Plan:   Spoke with patient today and she was agreeable to enrollment and subsequent F/U calls. Pt. Denies any psychosocial needs at this time, but would like [facility] info in case she needs it in the future. Pt. Is established with Dr. Alves and stated she has a F/U scheduled 8/4/22. Last seen by PCP 6/15/22. I informed her OB Clinic tried reaching her today, but was unable to leave . She would like their contact info emailed to her to return  their call. Sent to email. Pt. Was agreeable to receiving WIC info in case she needs it in the future. She stated at this time they are not sure if it is an ectopic pregnancy. Pt. Denies smoking. Pt. Drinks only on occasion socially. Pt. Denied the need for any resources in the community at this time. [facility] form, [place] Virtual Visit Flyer, [facility] on call RN#, and Heart Healthy Diet education all sent to email. Will F/U in a couple of months with patient since she denied any needs at this time.

## 2022-07-22 LAB — BLD GP AB SCN TITR SERPL: NORMAL {TITER}

## 2022-07-28 ENCOUNTER — TELEPHONE (OUTPATIENT)
Dept: OBSTETRICS AND GYNECOLOGY | Facility: HOSPITAL | Age: 37
End: 2022-07-28
Payer: MEDICAID

## 2022-07-28 NOTE — TELEPHONE ENCOUNTER
Called and left patient a voicemail. Seems clinic couldn't get in touch with her to set up an appointment. Stated that she should come get her lab drawn here at [facility] or she should call and make an appointment at [place]. Left call back instructions.     Ludmila Ramires MD  OB-GYN, PGY-1

## 2022-08-02 ENCOUNTER — TELEPHONE (OUTPATIENT)
Dept: OBSTETRICS AND GYNECOLOGY | Facility: CLINIC | Age: 37
End: 2022-08-02
Payer: MEDICAID

## 2022-08-02 ENCOUNTER — TELEPHONE (OUTPATIENT)
Dept: OBSTETRICS AND GYNECOLOGY | Facility: HOSPITAL | Age: 37
End: 2022-08-02
Payer: MEDICAID

## 2022-08-02 NOTE — TELEPHONE ENCOUNTER
Spoke to patient and was informed about her laparoscopic salpingectomy performed yesterday at [facility] for suspected left tubal ectopic pregnancy, we do not have access to these records. The patient expressed wanting to follow-up at [facility] as it is more convenient for her. I recommended she get beta hCG drawn this week at our lab and schedule an appointment at the [facility] for close follow-up in the next week. Will contact clinic staff to schedule an appointment for next week.    Cammie Florez MD PGY-1  Obstetrics and Gynecology

## 2022-08-02 NOTE — TELEPHONE ENCOUNTER
Message received to schedule pt a follow up in the resident gyn follow up clinic. Pt has been scheduled at [address] [place] [address]. All questions answered.

## 2022-08-02 NOTE — TELEPHONE ENCOUNTER
----- Message from Cammie Florez MD sent at 8/2/2022 10:14 AM CDT -----  Hello,    This patient was treated for a suspected ectopic pregnancy at [facility] yesterday. She would like to follow-up with us. Can you schedule her for a visit with the GYN resident clinic in the next week or two?    Thanks,  Cammie Florez MD PGY-1  Obstetrics and Gynecology

## 2022-08-10 ENCOUNTER — TELEPHONE (OUTPATIENT)
Dept: OBSTETRICS AND GYNECOLOGY | Facility: HOSPITAL | Age: 37
End: 2022-08-10
Payer: MEDICAID

## 2022-08-10 NOTE — TELEPHONE ENCOUNTER
Called patient, no response. Unable to leave voicemail due to mailbox being full. Patient has appointment with me in clinic today. Will discuss her recovery and importance of ensuring beta hCG level is low. Will continue to follow.    Cammie Florez MD PGY-1  Obstetrics and Gynecology

## 2022-08-18 ENCOUNTER — TELEPHONE (OUTPATIENT)
Dept: OBSTETRICS AND GYNECOLOGY | Facility: HOSPITAL | Age: 37
End: 2022-08-18
Payer: MEDICAID

## 2022-08-18 NOTE — TELEPHONE ENCOUNTER
Called patient, no response. Unable to leave voicemail due to mailbox being full. Need to discuss her recovery and importance of ensuring beta hCG level is low. Will continue to follow and call back.     Cammie Florez MD PGY-1  Obstetrics and Gynecology

## 2022-10-05 ENCOUNTER — PATIENT OUTREACH (OUTPATIENT)
Dept: EMERGENCY MEDICINE | Facility: OTHER | Age: 37
End: 2022-10-05
Payer: MEDICAID

## 2022-10-05 NOTE — PROGRESS NOTES
Spoke to patient today and she stated she was doing well, but she did have a miscarriage since we last spoke. I offered behavioral health resources to the patient in case she may like to speak with someone for grief counseling and she declined stating she is doing well and has a great support system. Pt stated she did receive previously sent resources via e-mail. Pt had no additional needs at this time. Instructed pt to call with any future needs/concerns and she verbalized understanding.

## 2023-04-19 ENCOUNTER — PATIENT MESSAGE (OUTPATIENT)
Dept: RESEARCH | Facility: HOSPITAL | Age: 38
End: 2023-04-19
Payer: MEDICAID

## 2023-05-22 ENCOUNTER — PATIENT OUTREACH (OUTPATIENT)
Dept: EMERGENCY MEDICINE | Facility: OTHER | Age: 38
End: 2023-05-22
Payer: MEDICAID

## 2023-05-22 NOTE — PROGRESS NOTES
Spoke to patient today and she stated she has been doing well and has no additional needs at this time. Instructed pt to call with any future needs/concerns and pt verbalized understanding.

## 2024-11-21 ENCOUNTER — HOSPITAL ENCOUNTER (EMERGENCY)
Facility: OTHER | Age: 39
Discharge: LEFT BEFORE DISCHARGE | End: 2024-11-21
Payer: MEDICAID

## 2024-11-21 VITALS
HEIGHT: 66 IN | TEMPERATURE: 99 F | RESPIRATION RATE: 18 BRPM | OXYGEN SATURATION: 97 % | SYSTOLIC BLOOD PRESSURE: 150 MMHG | BODY MASS INDEX: 32.78 KG/M2 | WEIGHT: 204 LBS | DIASTOLIC BLOOD PRESSURE: 94 MMHG | HEART RATE: 107 BPM

## 2024-11-21 LAB
ALBUMIN SERPL BCP-MCNC: 4 G/DL (ref 3.5–5.2)
ALP SERPL-CCNC: 83 U/L (ref 40–150)
ALT SERPL W/O P-5'-P-CCNC: 27 U/L (ref 10–44)
ANION GAP SERPL CALC-SCNC: 9 MMOL/L (ref 8–16)
AST SERPL-CCNC: 26 U/L (ref 10–40)
BASOPHILS # BLD AUTO: 0.01 K/UL (ref 0–0.2)
BASOPHILS NFR BLD: 0.1 % (ref 0–1.9)
BILIRUB SERPL-MCNC: 0.5 MG/DL (ref 0.1–1)
BUN SERPL-MCNC: 12 MG/DL (ref 6–20)
CALCIUM SERPL-MCNC: 9.6 MG/DL (ref 8.7–10.5)
CHLORIDE SERPL-SCNC: 110 MMOL/L (ref 95–110)
CO2 SERPL-SCNC: 20 MMOL/L (ref 23–29)
CREAT SERPL-MCNC: 0.9 MG/DL (ref 0.5–1.4)
DIFFERENTIAL METHOD BLD: ABNORMAL
EOSINOPHIL # BLD AUTO: 0 K/UL (ref 0–0.5)
EOSINOPHIL NFR BLD: 0.3 % (ref 0–8)
ERYTHROCYTE [DISTWIDTH] IN BLOOD BY AUTOMATED COUNT: 14.5 % (ref 11.5–14.5)
EST. GFR  (NO RACE VARIABLE): >60 ML/MIN/1.73 M^2
GLUCOSE SERPL-MCNC: 121 MG/DL (ref 70–110)
HCT VFR BLD AUTO: 43.1 % (ref 37–48.5)
HGB BLD-MCNC: 13.9 G/DL (ref 12–16)
IMM GRANULOCYTES # BLD AUTO: 0.02 K/UL (ref 0–0.04)
IMM GRANULOCYTES NFR BLD AUTO: 0.3 % (ref 0–0.5)
LIPASE SERPL-CCNC: 18 U/L (ref 4–60)
LYMPHOCYTES # BLD AUTO: 0.8 K/UL (ref 1–4.8)
LYMPHOCYTES NFR BLD: 10.5 % (ref 18–48)
MCH RBC QN AUTO: 26.3 PG (ref 27–31)
MCHC RBC AUTO-ENTMCNC: 32.3 G/DL (ref 32–36)
MCV RBC AUTO: 82 FL (ref 82–98)
MONOCYTES # BLD AUTO: 0.4 K/UL (ref 0.3–1)
MONOCYTES NFR BLD: 4.7 % (ref 4–15)
NEUTROPHILS # BLD AUTO: 6.3 K/UL (ref 1.8–7.7)
NEUTROPHILS NFR BLD: 84.1 % (ref 38–73)
NRBC BLD-RTO: 0 /100 WBC
PLATELET # BLD AUTO: 283 K/UL (ref 150–450)
PMV BLD AUTO: 10.1 FL (ref 9.2–12.9)
POTASSIUM SERPL-SCNC: 3.9 MMOL/L (ref 3.5–5.1)
PROT SERPL-MCNC: 8.1 G/DL (ref 6–8.4)
RBC # BLD AUTO: 5.29 M/UL (ref 4–5.4)
SODIUM SERPL-SCNC: 139 MMOL/L (ref 136–145)
WBC # BLD AUTO: 7.43 K/UL (ref 3.9–12.7)

## 2024-11-21 PROCEDURE — 83690 ASSAY OF LIPASE: CPT | Performed by: NURSE PRACTITIONER

## 2024-11-21 PROCEDURE — 80053 COMPREHEN METABOLIC PANEL: CPT | Performed by: NURSE PRACTITIONER

## 2024-11-21 PROCEDURE — 99283 EMERGENCY DEPT VISIT LOW MDM: CPT

## 2024-11-21 PROCEDURE — 85025 COMPLETE CBC W/AUTO DIFF WBC: CPT | Performed by: NURSE PRACTITIONER

## 2024-11-22 NOTE — FIRST PROVIDER EVALUATION
Emergency Department TeleTriage Encounter Note      CHIEF COMPLAINT    Chief Complaint   Patient presents with    Emesis     Reports NV and upper abd pain onset this morning. Unable to keep anything down       VITAL SIGNS   Initial Vitals [11/21/24 1855]   BP Pulse Resp Temp SpO2   (!) 150/94 107 18 98.8 °F (37.1 °C) 97 %      MAP       --            ALLERGIES    Review of patient's allergies indicates:  No Known Allergies    PROVIDER TRIAGE NOTE  TeleTriage Note: Craig De Paz, a nontoxic/well appearing, 39 y.o. female, presented to the ED with c/o epigastric abdominal pain with N/V/D that began this morning around 5 am this morning. Denies fevers or sick contacts.     All ED beds are full at present; patient notified of this status.  Patient seen and medically screened by Nurse Practitioner via teletriage. Orders initiated at triage to expedite care.  Patient is stable to return to the waiting room and will be placed in an ED bed when available.  Care will be transferred to an alternate provider when patient has been placed in an Exam Room from the [facility] for physical exam, additional orders, and disposition.  6:58 PM Phyllis Ingram, DNP, FNP-C        ORDERS  Labs Reviewed   CBC W/ AUTO DIFFERENTIAL   COMPREHENSIVE METABOLIC PANEL   LIPASE   URINALYSIS, REFLEX TO URINE CULTURE   POCT URINE PREGNANCY       ED Orders (720h ago, onward)      Start Ordered     Status Ordering Provider    11/21/24 1900 11/21/24 1859  Vital signs  Every 2 hours         Ordered PHYLLIS INGRAM    11/21/24 1900 11/21/24 1859  Diet NPO  Diet effective now         Ordered PHYLLIS INGRAM    11/21/24 1900 11/21/24 1859  Insert peripheral IV  Once         Ordered PHYLLIS INGRAM    11/21/24 1900 11/21/24 1859  CBC W/ AUTO DIFFERENTIAL  STAT         Ordered PHYLLIS INGRAM    11/21/24 1900 11/21/24 1859  Comp. Metabolic Panel  STAT         Ordered PHYLLIS INGRAM    11/21/24 1900 11/21/24 1859  POCT Venous Blood Gas  (Creatinine Only)  Once        Comments: This test should be used for VBGs.  If using this order for other tests (K, creatinine, HCT, PT/INR, lactate etc)  ONLY do so in the case of an emergency or rapid response.Notify Physician if: see parameters below.      Ordered REBECCAFRANCISCO    11/21/24 1900 11/21/24 1859  Lipase  STAT         Ordered REBECCAFRANCISCO    11/21/24 1900 11/21/24 1859  Urinalysis, Reflex to Urine Culture Urine, Clean Catch  STAT         Ordered ERBECCAFRANCISCO    11/21/24 1900 11/21/24 1859  POCT urine pregnancy  Once         Ordered REBECCAFRANCISCO              Virtual Visit Note: The provider triage portion of this emergency department evaluation and documentation was performed via [employer], a HIPAA-compliant telemedicine application, in concert with a tele-presenter in the room. A face to face patient evaluation with one of my colleagues will occur once the patient is placed in an emergency department room.      DISCLAIMER: This note was prepared with [employer] voice recognition transcription software. Garbled syntax, mangled pronouns, and other bizarre constructions may be attributed to that software system.

## 2024-11-22 NOTE — ED TRIAGE NOTES
Upper abdominal cramping with N/V/D since this morning. States unable to tolerate anything by mouth. Denies fever, urinary symptoms. No sick contacts. Last emesis just PTA. Presents awake, alert.